# Patient Record
Sex: FEMALE | Race: WHITE | Employment: OTHER | URBAN - NONMETROPOLITAN AREA
[De-identification: names, ages, dates, MRNs, and addresses within clinical notes are randomized per-mention and may not be internally consistent; named-entity substitution may affect disease eponyms.]

---

## 2023-10-14 ENCOUNTER — HOSPITAL ENCOUNTER (INPATIENT)
Age: 63
LOS: 1 days | Discharge: ANOTHER HOSPITAL | DRG: 101 | End: 2023-10-15
Attending: EMERGENCY MEDICINE | Admitting: STUDENT IN AN ORGANIZED HEALTH CARE EDUCATION/TRAINING PROGRAM
Payer: MEDICARE

## 2023-10-14 ENCOUNTER — APPOINTMENT (OUTPATIENT)
Dept: CT IMAGING | Age: 63
DRG: 101 | End: 2023-10-14
Payer: MEDICARE

## 2023-10-14 DIAGNOSIS — R41.82 ALTERED MENTAL STATUS, UNSPECIFIED ALTERED MENTAL STATUS TYPE: Primary | ICD-10-CM

## 2023-10-14 DIAGNOSIS — R55 SYNCOPE, UNSPECIFIED SYNCOPE TYPE: ICD-10-CM

## 2023-10-14 PROBLEM — R53.1 GENERALIZED WEAKNESS: Status: ACTIVE | Noted: 2023-10-14

## 2023-10-14 PROBLEM — R41.0 ACUTE DELIRIUM: Status: ACTIVE | Noted: 2023-10-14

## 2023-10-14 PROBLEM — J34.89 RHINORRHEA: Status: ACTIVE | Noted: 2023-10-14

## 2023-10-14 PROBLEM — R53.83 FATIGUE: Status: ACTIVE | Noted: 2023-10-14

## 2023-10-14 PROBLEM — R40.4 STARING EPISODES: Status: ACTIVE | Noted: 2023-10-14

## 2023-10-14 PROBLEM — I10 HTN (HYPERTENSION): Status: ACTIVE | Noted: 2023-10-14

## 2023-10-14 PROBLEM — K21.9 GERD (GASTROESOPHAGEAL REFLUX DISEASE): Status: ACTIVE | Noted: 2023-10-14

## 2023-10-14 PROBLEM — R29.90 STROKE-LIKE SYMPTOMS: Status: ACTIVE | Noted: 2023-10-14

## 2023-10-14 LAB
ALBUMIN SERPL-MCNC: 4.2 G/DL (ref 3.5–5.2)
ALBUMIN/GLOB SERPL: 1.4 {RATIO} (ref 1–2.5)
ALP SERPL-CCNC: 128 U/L (ref 35–104)
ALT SERPL-CCNC: 9 U/L (ref 5–33)
ANION GAP SERPL CALCULATED.3IONS-SCNC: 10 MMOL/L (ref 9–17)
AST SERPL-CCNC: 16 U/L
BACTERIA URNS QL MICRO: ABNORMAL
BASOPHILS # BLD: 0.04 K/UL (ref 0–0.2)
BASOPHILS NFR BLD: 1 % (ref 0–2)
BILIRUB SERPL-MCNC: 0.5 MG/DL (ref 0.3–1.2)
BILIRUB UR QL STRIP: NEGATIVE
BUN SERPL-MCNC: 15 MG/DL (ref 8–23)
BUN/CREAT SERPL: 17 (ref 9–20)
CALCIUM SERPL-MCNC: 9.4 MG/DL (ref 8.6–10.4)
CHLORIDE SERPL-SCNC: 106 MMOL/L (ref 98–107)
CLARITY UR: CLEAR
CO2 SERPL-SCNC: 25 MMOL/L (ref 20–31)
COLOR UR: YELLOW
CREAT SERPL-MCNC: 0.9 MG/DL (ref 0.5–0.9)
EOSINOPHIL # BLD: 0.08 K/UL (ref 0–0.44)
EOSINOPHILS RELATIVE PERCENT: 2 % (ref 1–4)
EPI CELLS #/AREA URNS HPF: ABNORMAL /HPF (ref 0–25)
ERYTHROCYTE [DISTWIDTH] IN BLOOD BY AUTOMATED COUNT: 13.5 % (ref 11.8–14.4)
GFR SERPL CREATININE-BSD FRML MDRD: >60 ML/MIN/1.73M2
GLUCOSE SERPL-MCNC: 148 MG/DL (ref 70–99)
GLUCOSE UR STRIP-MCNC: NEGATIVE MG/DL
HCT VFR BLD AUTO: 42.1 % (ref 36.3–47.1)
HGB BLD-MCNC: 13.7 G/DL (ref 11.9–15.1)
HGB UR QL STRIP.AUTO: NEGATIVE
IMM GRANULOCYTES # BLD AUTO: <0.03 K/UL (ref 0–0.3)
IMM GRANULOCYTES NFR BLD: 0 %
KETONES UR STRIP-MCNC: NEGATIVE MG/DL
LACTATE BLDV-SCNC: 1.7 MMOL/L (ref 0.5–2.2)
LEUKOCYTE ESTERASE UR QL STRIP: NEGATIVE
LYMPHOCYTES NFR BLD: 1.63 K/UL (ref 1.1–3.7)
LYMPHOCYTES RELATIVE PERCENT: 35 % (ref 24–43)
MAGNESIUM SERPL-MCNC: 2 MG/DL (ref 1.6–2.6)
MCH RBC QN AUTO: 29.6 PG (ref 25.2–33.5)
MCHC RBC AUTO-ENTMCNC: 32.5 G/DL (ref 28.4–34.8)
MCV RBC AUTO: 90.9 FL (ref 82.6–102.9)
MONOCYTES NFR BLD: 0.27 K/UL (ref 0.1–1.2)
MONOCYTES NFR BLD: 6 % (ref 3–12)
MUCOUS THREADS URNS QL MICRO: ABNORMAL
NEUTROPHILS NFR BLD: 56 % (ref 36–65)
NEUTS SEG NFR BLD: 2.63 K/UL (ref 1.5–8.1)
NITRITE UR QL STRIP: NEGATIVE
NRBC BLD-RTO: 0 PER 100 WBC
PH UR STRIP: 6 [PH] (ref 5–9)
PLATELET # BLD AUTO: 303 K/UL (ref 138–453)
PMV BLD AUTO: 10.4 FL (ref 8.1–13.5)
POTASSIUM SERPL-SCNC: 3.8 MMOL/L (ref 3.7–5.3)
PROT SERPL-MCNC: 7.1 G/DL (ref 6.4–8.3)
PROT UR STRIP-MCNC: NEGATIVE MG/DL
RBC # BLD AUTO: 4.63 M/UL (ref 3.95–5.11)
RBC #/AREA URNS HPF: ABNORMAL /HPF (ref 0–2)
SARS-COV-2 RDRP RESP QL NAA+PROBE: NOT DETECTED
SODIUM SERPL-SCNC: 141 MMOL/L (ref 135–144)
SP GR UR STRIP: 1.01 (ref 1.01–1.02)
SPECIMEN DESCRIPTION: NORMAL
TROPONIN I SERPL HS-MCNC: 9 NG/L (ref 0–14)
TSH SERPL DL<=0.05 MIU/L-ACNC: 1.76 UIU/ML (ref 0.3–5)
UROBILINOGEN UR STRIP-ACNC: NORMAL EU/DL (ref 0–1)
WBC #/AREA URNS HPF: ABNORMAL /HPF (ref 0–5)
WBC OTHER # BLD: 4.7 K/UL (ref 3.5–11.3)

## 2023-10-14 PROCEDURE — 83735 ASSAY OF MAGNESIUM: CPT

## 2023-10-14 PROCEDURE — 84484 ASSAY OF TROPONIN QUANT: CPT

## 2023-10-14 PROCEDURE — 82607 VITAMIN B-12: CPT

## 2023-10-14 PROCEDURE — 84443 ASSAY THYROID STIM HORMONE: CPT

## 2023-10-14 PROCEDURE — 93005 ELECTROCARDIOGRAM TRACING: CPT | Performed by: EMERGENCY MEDICINE

## 2023-10-14 PROCEDURE — 83605 ASSAY OF LACTIC ACID: CPT

## 2023-10-14 PROCEDURE — 87086 URINE CULTURE/COLONY COUNT: CPT

## 2023-10-14 PROCEDURE — 85025 COMPLETE CBC W/AUTO DIFF WBC: CPT

## 2023-10-14 PROCEDURE — 1200000000 HC SEMI PRIVATE

## 2023-10-14 PROCEDURE — 6360000004 HC RX CONTRAST MEDICATION: Performed by: EMERGENCY MEDICINE

## 2023-10-14 PROCEDURE — 87635 SARS-COV-2 COVID-19 AMP PRB: CPT

## 2023-10-14 PROCEDURE — 81001 URINALYSIS AUTO W/SCOPE: CPT

## 2023-10-14 PROCEDURE — 94761 N-INVAS EAR/PLS OXIMETRY MLT: CPT

## 2023-10-14 PROCEDURE — 82746 ASSAY OF FOLIC ACID SERUM: CPT

## 2023-10-14 PROCEDURE — 83036 HEMOGLOBIN GLYCOSYLATED A1C: CPT

## 2023-10-14 PROCEDURE — 80053 COMPREHEN METABOLIC PANEL: CPT

## 2023-10-14 PROCEDURE — 99285 EMERGENCY DEPT VISIT HI MDM: CPT

## 2023-10-14 PROCEDURE — 70450 CT HEAD/BRAIN W/O DYE: CPT

## 2023-10-14 PROCEDURE — 6360000002 HC RX W HCPCS: Performed by: STUDENT IN AN ORGANIZED HEALTH CARE EDUCATION/TRAINING PROGRAM

## 2023-10-14 PROCEDURE — 74177 CT ABD & PELVIS W/CONTRAST: CPT

## 2023-10-14 PROCEDURE — 6370000000 HC RX 637 (ALT 250 FOR IP): Performed by: STUDENT IN AN ORGANIZED HEALTH CARE EDUCATION/TRAINING PROGRAM

## 2023-10-14 PROCEDURE — 2580000003 HC RX 258: Performed by: STUDENT IN AN ORGANIZED HEALTH CARE EDUCATION/TRAINING PROGRAM

## 2023-10-14 PROCEDURE — 36415 COLL VENOUS BLD VENIPUNCTURE: CPT

## 2023-10-14 RX ORDER — OMEPRAZOLE 20 MG/1
20 CAPSULE, DELAYED RELEASE ORAL DAILY
COMMUNITY

## 2023-10-14 RX ORDER — TOPIRAMATE 100 MG/1
100 TABLET, FILM COATED ORAL 2 TIMES DAILY
Status: DISCONTINUED | OUTPATIENT
Start: 2023-10-14 | End: 2023-10-15 | Stop reason: HOSPADM

## 2023-10-14 RX ORDER — ONDANSETRON 4 MG/1
4 TABLET, ORALLY DISINTEGRATING ORAL EVERY 8 HOURS PRN
Status: DISCONTINUED | OUTPATIENT
Start: 2023-10-14 | End: 2023-10-15 | Stop reason: HOSPADM

## 2023-10-14 RX ORDER — ONDANSETRON 2 MG/ML
4 INJECTION INTRAMUSCULAR; INTRAVENOUS EVERY 6 HOURS PRN
Status: DISCONTINUED | OUTPATIENT
Start: 2023-10-14 | End: 2023-10-15 | Stop reason: HOSPADM

## 2023-10-14 RX ORDER — POLYETHYLENE GLYCOL 3350 17 G/17G
17 POWDER, FOR SOLUTION ORAL DAILY PRN
Status: DISCONTINUED | OUTPATIENT
Start: 2023-10-14 | End: 2023-10-15 | Stop reason: HOSPADM

## 2023-10-14 RX ORDER — FUROSEMIDE 20 MG/1
20 TABLET ORAL DAILY
Status: DISCONTINUED | OUTPATIENT
Start: 2023-10-14 | End: 2023-10-15 | Stop reason: HOSPADM

## 2023-10-14 RX ORDER — SODIUM CHLORIDE 9 MG/ML
INJECTION, SOLUTION INTRAVENOUS CONTINUOUS
Status: DISCONTINUED | OUTPATIENT
Start: 2023-10-14 | End: 2023-10-15 | Stop reason: HOSPADM

## 2023-10-14 RX ORDER — PANTOPRAZOLE SODIUM 40 MG/1
40 TABLET, DELAYED RELEASE ORAL
Status: DISCONTINUED | OUTPATIENT
Start: 2023-10-15 | End: 2023-10-15 | Stop reason: HOSPADM

## 2023-10-14 RX ORDER — MAGNESIUM SULFATE IN WATER 40 MG/ML
2000 INJECTION, SOLUTION INTRAVENOUS PRN
Status: DISCONTINUED | OUTPATIENT
Start: 2023-10-14 | End: 2023-10-15 | Stop reason: HOSPADM

## 2023-10-14 RX ORDER — FLUTICASONE PROPIONATE AND SALMETEROL XINAFOATE 115; 21 UG/1; UG/1
2 AEROSOL, METERED RESPIRATORY (INHALATION) 2 TIMES DAILY
COMMUNITY

## 2023-10-14 RX ORDER — POTASSIUM CHLORIDE 7.45 MG/ML
10 INJECTION INTRAVENOUS PRN
Status: DISCONTINUED | OUTPATIENT
Start: 2023-10-14 | End: 2023-10-15 | Stop reason: HOSPADM

## 2023-10-14 RX ORDER — ATORVASTATIN CALCIUM 10 MG/1
10 TABLET, FILM COATED ORAL DAILY
Status: DISCONTINUED | OUTPATIENT
Start: 2023-10-14 | End: 2023-10-15 | Stop reason: HOSPADM

## 2023-10-14 RX ORDER — SODIUM CHLORIDE 9 MG/ML
INJECTION, SOLUTION INTRAVENOUS PRN
Status: DISCONTINUED | OUTPATIENT
Start: 2023-10-14 | End: 2023-10-15 | Stop reason: HOSPADM

## 2023-10-14 RX ORDER — SIMVASTATIN 20 MG
20 TABLET ORAL NIGHTLY
COMMUNITY

## 2023-10-14 RX ORDER — POTASSIUM CHLORIDE 20 MEQ/1
40 TABLET, EXTENDED RELEASE ORAL PRN
Status: DISCONTINUED | OUTPATIENT
Start: 2023-10-14 | End: 2023-10-15 | Stop reason: HOSPADM

## 2023-10-14 RX ORDER — ENOXAPARIN SODIUM 100 MG/ML
30 INJECTION SUBCUTANEOUS 2 TIMES DAILY
Status: DISCONTINUED | OUTPATIENT
Start: 2023-10-14 | End: 2023-10-15 | Stop reason: HOSPADM

## 2023-10-14 RX ORDER — ACETAMINOPHEN 325 MG/1
650 TABLET ORAL EVERY 6 HOURS PRN
Status: DISCONTINUED | OUTPATIENT
Start: 2023-10-14 | End: 2023-10-15 | Stop reason: HOSPADM

## 2023-10-14 RX ORDER — SODIUM CHLORIDE 0.9 % (FLUSH) 0.9 %
5-40 SYRINGE (ML) INJECTION PRN
Status: DISCONTINUED | OUTPATIENT
Start: 2023-10-14 | End: 2023-10-15 | Stop reason: HOSPADM

## 2023-10-14 RX ORDER — KETOTIFEN FUMARATE 0.35 MG/ML
1 SOLUTION/ DROPS OPHTHALMIC 2 TIMES DAILY
Status: DISCONTINUED | OUTPATIENT
Start: 2023-10-14 | End: 2023-10-15 | Stop reason: HOSPADM

## 2023-10-14 RX ORDER — ALBUTEROL SULFATE 90 UG/1
2 AEROSOL, METERED RESPIRATORY (INHALATION) EVERY 6 HOURS PRN
COMMUNITY

## 2023-10-14 RX ORDER — LOSARTAN POTASSIUM 25 MG/1
25 TABLET ORAL DAILY
Status: DISCONTINUED | OUTPATIENT
Start: 2023-10-14 | End: 2023-10-15 | Stop reason: HOSPADM

## 2023-10-14 RX ORDER — ACETAMINOPHEN 160 MG
2000 TABLET,DISINTEGRATING ORAL DAILY
COMMUNITY

## 2023-10-14 RX ORDER — TOPIRAMATE 100 MG/1
100 TABLET, FILM COATED ORAL 2 TIMES DAILY
COMMUNITY

## 2023-10-14 RX ORDER — SODIUM CHLORIDE 0.9 % (FLUSH) 0.9 %
5-40 SYRINGE (ML) INJECTION EVERY 12 HOURS SCHEDULED
Status: DISCONTINUED | OUTPATIENT
Start: 2023-10-14 | End: 2023-10-15 | Stop reason: HOSPADM

## 2023-10-14 RX ORDER — MONTELUKAST SODIUM 10 MG/1
10 TABLET ORAL NIGHTLY
COMMUNITY

## 2023-10-14 RX ORDER — LOSARTAN POTASSIUM 25 MG/1
25 TABLET ORAL DAILY
COMMUNITY

## 2023-10-14 RX ORDER — VITAMIN B COMPLEX
2000 TABLET ORAL DAILY
Status: DISCONTINUED | OUTPATIENT
Start: 2023-10-14 | End: 2023-10-15 | Stop reason: HOSPADM

## 2023-10-14 RX ORDER — FUROSEMIDE 20 MG/1
20 TABLET ORAL DAILY
COMMUNITY

## 2023-10-14 RX ORDER — MONTELUKAST SODIUM 10 MG/1
10 TABLET ORAL NIGHTLY
Status: DISCONTINUED | OUTPATIENT
Start: 2023-10-14 | End: 2023-10-15 | Stop reason: HOSPADM

## 2023-10-14 RX ORDER — KETOTIFEN FUMARATE 0.35 MG/ML
1 SOLUTION/ DROPS OPHTHALMIC 2 TIMES DAILY
COMMUNITY

## 2023-10-14 RX ORDER — ACETAMINOPHEN 650 MG/1
650 SUPPOSITORY RECTAL EVERY 6 HOURS PRN
Status: DISCONTINUED | OUTPATIENT
Start: 2023-10-14 | End: 2023-10-15 | Stop reason: HOSPADM

## 2023-10-14 RX ADMIN — ENOXAPARIN SODIUM 30 MG: 100 INJECTION SUBCUTANEOUS at 21:21

## 2023-10-14 RX ADMIN — IOPAMIDOL 75 ML: 755 INJECTION, SOLUTION INTRAVENOUS at 15:45

## 2023-10-14 RX ADMIN — MONTELUKAST 10 MG: 10 TABLET, FILM COATED ORAL at 21:21

## 2023-10-14 RX ADMIN — METFORMIN HYDROCHLORIDE 500 MG: 500 TABLET ORAL at 21:21

## 2023-10-14 RX ADMIN — TOPIRAMATE 100 MG: 100 TABLET, FILM COATED ORAL at 21:21

## 2023-10-14 RX ADMIN — SODIUM CHLORIDE: 9 INJECTION, SOLUTION INTRAVENOUS at 19:38

## 2023-10-14 RX ADMIN — ATORVASTATIN CALCIUM 10 MG: 10 TABLET, FILM COATED ORAL at 21:21

## 2023-10-14 ASSESSMENT — PAIN SCALES - GENERAL
PAINLEVEL_OUTOF10: 10
PAINLEVEL_OUTOF10: 7

## 2023-10-14 ASSESSMENT — PAIN DESCRIPTION - FREQUENCY: FREQUENCY: CONTINUOUS

## 2023-10-14 ASSESSMENT — ENCOUNTER SYMPTOMS
VOICE CHANGE: 0
COUGH: 0
RHINORRHEA: 1
NAUSEA: 0
SORE THROAT: 0
TROUBLE SWALLOWING: 0
VOMITING: 0
SHORTNESS OF BREATH: 0
ABDOMINAL PAIN: 0

## 2023-10-14 ASSESSMENT — PAIN DESCRIPTION - ORIENTATION: ORIENTATION: MID

## 2023-10-14 ASSESSMENT — PAIN DESCRIPTION - LOCATION
LOCATION: HEAD
LOCATION: HEAD

## 2023-10-14 ASSESSMENT — PAIN DESCRIPTION - PAIN TYPE: TYPE: ACUTE PAIN

## 2023-10-14 ASSESSMENT — PAIN - FUNCTIONAL ASSESSMENT
PAIN_FUNCTIONAL_ASSESSMENT: 0-10
PAIN_FUNCTIONAL_ASSESSMENT: ACTIVITIES ARE NOT PREVENTED

## 2023-10-14 ASSESSMENT — PAIN DESCRIPTION - ONSET: ONSET: ON-GOING

## 2023-10-14 ASSESSMENT — PAIN DESCRIPTION - DESCRIPTORS: DESCRIPTORS: ACHING

## 2023-10-14 NOTE — ED PROVIDER NOTES
arrrange. FINAL IMPRESSION      1. Altered mental status, unspecified altered mental status type    2.  Syncope, unspecified syncope type          DISPOSITION/PLAN     DISPOSITION Admitted 10/14/2023 04:52:42 PM    (Please note that portions of this note were completed with a voice recognition program.  Efforts were made to edit the dictations but occasionally words are mis-transcribed.)    aJmes Lima MD (electronically signed)  Attending Emergency Physician            James Lima MD  10/15/23 9198

## 2023-10-14 NOTE — ED NOTES
Patient up to bathroom with wheelchair. No complaints at this time.      Gilles Dubois RN  10/14/23 0123

## 2023-10-15 VITALS
HEART RATE: 85 BPM | DIASTOLIC BLOOD PRESSURE: 69 MMHG | BODY MASS INDEX: 40.55 KG/M2 | RESPIRATION RATE: 18 BRPM | TEMPERATURE: 98.7 F | OXYGEN SATURATION: 97 % | HEIGHT: 63 IN | SYSTOLIC BLOOD PRESSURE: 126 MMHG | WEIGHT: 228.84 LBS

## 2023-10-15 PROBLEM — R56.9 SEIZURE-LIKE ACTIVITY (HCC): Status: ACTIVE | Noted: 2023-10-15

## 2023-10-15 LAB
ALBUMIN SERPL-MCNC: 4 G/DL (ref 3.5–5.2)
ALBUMIN/GLOB SERPL: 1.1 {RATIO} (ref 1–2.5)
ALP SERPL-CCNC: 133 U/L (ref 35–104)
ALT SERPL-CCNC: 9 U/L (ref 5–33)
ANION GAP SERPL CALCULATED.3IONS-SCNC: 13 MMOL/L (ref 9–17)
AST SERPL-CCNC: 17 U/L
BASOPHILS # BLD: 0.04 K/UL (ref 0–0.2)
BASOPHILS NFR BLD: 1 % (ref 0–2)
BILIRUB SERPL-MCNC: 0.4 MG/DL (ref 0.3–1.2)
BUN SERPL-MCNC: 14 MG/DL (ref 8–23)
BUN/CREAT SERPL: 14 (ref 9–20)
CALCIUM SERPL-MCNC: 9.4 MG/DL (ref 8.6–10.4)
CHLORIDE SERPL-SCNC: 103 MMOL/L (ref 98–107)
CO2 SERPL-SCNC: 25 MMOL/L (ref 20–31)
CREAT SERPL-MCNC: 1 MG/DL (ref 0.5–0.9)
EKG ATRIAL RATE: 67 BPM
EKG P AXIS: 34 DEGREES
EKG P-R INTERVAL: 162 MS
EKG Q-T INTERVAL: 436 MS
EKG QRS DURATION: 84 MS
EKG QTC CALCULATION (BAZETT): 460 MS
EKG R AXIS: -14 DEGREES
EKG T AXIS: -61 DEGREES
EKG VENTRICULAR RATE: 67 BPM
EOSINOPHIL # BLD: 0.15 K/UL (ref 0–0.44)
EOSINOPHILS RELATIVE PERCENT: 2 % (ref 1–4)
ERYTHROCYTE [DISTWIDTH] IN BLOOD BY AUTOMATED COUNT: 13.7 % (ref 11.8–14.4)
EST. AVERAGE GLUCOSE BLD GHB EST-MCNC: 131 MG/DL
FOLATE SERPL-MCNC: 8 NG/ML (ref 4.8–24.2)
GFR SERPL CREATININE-BSD FRML MDRD: >60 ML/MIN/1.73M2
GLUCOSE SERPL-MCNC: 106 MG/DL (ref 70–99)
HBA1C MFR BLD: 6.2 % (ref 4–6)
HCT VFR BLD AUTO: 43.2 % (ref 36.3–47.1)
HGB BLD-MCNC: 13.9 G/DL (ref 11.9–15.1)
IMM GRANULOCYTES # BLD AUTO: <0.03 K/UL (ref 0–0.3)
IMM GRANULOCYTES NFR BLD: 0 %
LYMPHOCYTES NFR BLD: 3 K/UL (ref 1.1–3.7)
LYMPHOCYTES RELATIVE PERCENT: 40 % (ref 24–43)
MCH RBC QN AUTO: 29.7 PG (ref 25.2–33.5)
MCHC RBC AUTO-ENTMCNC: 32.2 G/DL (ref 28.4–34.8)
MCV RBC AUTO: 92.3 FL (ref 82.6–102.9)
MICROORGANISM SPEC CULT: NORMAL
MONOCYTES NFR BLD: 0.48 K/UL (ref 0.1–1.2)
MONOCYTES NFR BLD: 7 % (ref 3–12)
NEUTROPHILS NFR BLD: 50 % (ref 36–65)
NEUTS SEG NFR BLD: 3.74 K/UL (ref 1.5–8.1)
NRBC BLD-RTO: 0 PER 100 WBC
PLATELET # BLD AUTO: 305 K/UL (ref 138–453)
PMV BLD AUTO: 10.5 FL (ref 8.1–13.5)
POTASSIUM SERPL-SCNC: 3.6 MMOL/L (ref 3.7–5.3)
PROLACTIN SERPL-MCNC: 29.2 NG/ML (ref 4.79–23.3)
PROT SERPL-MCNC: 7.5 G/DL (ref 6.4–8.3)
RBC # BLD AUTO: 4.68 M/UL (ref 3.95–5.11)
SODIUM SERPL-SCNC: 141 MMOL/L (ref 135–144)
SPECIMEN DESCRIPTION: NORMAL
VIT B12 SERPL-MCNC: 316 PG/ML (ref 232–1245)
WBC OTHER # BLD: 7.4 K/UL (ref 3.5–11.3)

## 2023-10-15 PROCEDURE — 6370000000 HC RX 637 (ALT 250 FOR IP): Performed by: STUDENT IN AN ORGANIZED HEALTH CARE EDUCATION/TRAINING PROGRAM

## 2023-10-15 PROCEDURE — 93010 ELECTROCARDIOGRAM REPORT: CPT | Performed by: FAMILY MEDICINE

## 2023-10-15 PROCEDURE — 36415 COLL VENOUS BLD VENIPUNCTURE: CPT

## 2023-10-15 PROCEDURE — 6360000002 HC RX W HCPCS: Performed by: STUDENT IN AN ORGANIZED HEALTH CARE EDUCATION/TRAINING PROGRAM

## 2023-10-15 PROCEDURE — 94640 AIRWAY INHALATION TREATMENT: CPT

## 2023-10-15 PROCEDURE — 94761 N-INVAS EAR/PLS OXIMETRY MLT: CPT

## 2023-10-15 PROCEDURE — 93005 ELECTROCARDIOGRAM TRACING: CPT | Performed by: STUDENT IN AN ORGANIZED HEALTH CARE EDUCATION/TRAINING PROGRAM

## 2023-10-15 PROCEDURE — 94664 DEMO&/EVAL PT USE INHALER: CPT

## 2023-10-15 PROCEDURE — 80053 COMPREHEN METABOLIC PANEL: CPT

## 2023-10-15 PROCEDURE — 84146 ASSAY OF PROLACTIN: CPT

## 2023-10-15 PROCEDURE — 2580000003 HC RX 258: Performed by: STUDENT IN AN ORGANIZED HEALTH CARE EDUCATION/TRAINING PROGRAM

## 2023-10-15 PROCEDURE — 85025 COMPLETE CBC W/AUTO DIFF WBC: CPT

## 2023-10-15 RX ORDER — LEVETIRACETAM 500 MG/1
500 TABLET ORAL 2 TIMES DAILY
Status: DISCONTINUED | OUTPATIENT
Start: 2023-10-15 | End: 2023-10-15 | Stop reason: HOSPADM

## 2023-10-15 RX ORDER — ALBUTEROL SULFATE 2.5 MG/3ML
2.5 SOLUTION RESPIRATORY (INHALATION)
Status: DISCONTINUED | OUTPATIENT
Start: 2023-10-15 | End: 2023-10-15

## 2023-10-15 RX ORDER — LEVETIRACETAM 10 MG/ML
1000 INJECTION INTRAVASCULAR ONCE
Status: COMPLETED | OUTPATIENT
Start: 2023-10-15 | End: 2023-10-15

## 2023-10-15 RX ORDER — ALBUTEROL SULFATE 2.5 MG/3ML
2.5 SOLUTION RESPIRATORY (INHALATION) EVERY 4 HOURS PRN
Status: DISCONTINUED | OUTPATIENT
Start: 2023-10-15 | End: 2023-10-15 | Stop reason: HOSPADM

## 2023-10-15 RX ORDER — ACETAMINOPHEN 500 MG
1000 TABLET ORAL ONCE
Status: COMPLETED | OUTPATIENT
Start: 2023-10-15 | End: 2023-10-15

## 2023-10-15 RX ADMIN — PANTOPRAZOLE SODIUM 40 MG: 40 TABLET, DELAYED RELEASE ORAL at 07:26

## 2023-10-15 RX ADMIN — LOSARTAN POTASSIUM 25 MG: 25 TABLET, FILM COATED ORAL at 09:31

## 2023-10-15 RX ADMIN — METFORMIN HYDROCHLORIDE 500 MG: 500 TABLET ORAL at 07:26

## 2023-10-15 RX ADMIN — ENOXAPARIN SODIUM 30 MG: 100 INJECTION SUBCUTANEOUS at 09:32

## 2023-10-15 RX ADMIN — MOMETASONE FUROATE AND FORMOTEROL FUMARATE DIHYDRATE 2 PUFF: 200; 5 AEROSOL RESPIRATORY (INHALATION) at 10:09

## 2023-10-15 RX ADMIN — LEVETIRACETAM 500 MG: 500 TABLET, FILM COATED ORAL at 09:31

## 2023-10-15 RX ADMIN — TOPIRAMATE 100 MG: 100 TABLET, FILM COATED ORAL at 09:40

## 2023-10-15 RX ADMIN — FUROSEMIDE 20 MG: 20 TABLET ORAL at 09:31

## 2023-10-15 RX ADMIN — ACETAMINOPHEN 1000 MG: 500 TABLET ORAL at 09:39

## 2023-10-15 RX ADMIN — SERTRALINE HYDROCHLORIDE 50 MG: 50 TABLET ORAL at 09:40

## 2023-10-15 RX ADMIN — SODIUM CHLORIDE: 9 INJECTION, SOLUTION INTRAVENOUS at 09:06

## 2023-10-15 RX ADMIN — ATORVASTATIN CALCIUM 10 MG: 10 TABLET, FILM COATED ORAL at 09:31

## 2023-10-15 RX ADMIN — LEVETIRACETAM 1000 MG: 10 INJECTION, SOLUTION INTRAVENOUS at 05:40

## 2023-10-15 RX ADMIN — ALBUTEROL SULFATE 2.5 MG: 2.5 SOLUTION RESPIRATORY (INHALATION) at 10:09

## 2023-10-15 RX ADMIN — Medication 2000 UNITS: at 09:31

## 2023-10-15 ASSESSMENT — PAIN DESCRIPTION - ORIENTATION
ORIENTATION: MID
ORIENTATION: MID;LOWER

## 2023-10-15 ASSESSMENT — PAIN DESCRIPTION - FREQUENCY
FREQUENCY: CONTINUOUS
FREQUENCY: CONTINUOUS

## 2023-10-15 ASSESSMENT — PAIN SCALES - GENERAL
PAINLEVEL_OUTOF10: 7
PAINLEVEL_OUTOF10: 8
PAINLEVEL_OUTOF10: 8

## 2023-10-15 ASSESSMENT — PAIN DESCRIPTION - PAIN TYPE
TYPE: CHRONIC PAIN
TYPE: CHRONIC PAIN

## 2023-10-15 ASSESSMENT — PAIN DESCRIPTION - LOCATION
LOCATION: HEAD
LOCATION: HEAD;BACK

## 2023-10-15 ASSESSMENT — PAIN DESCRIPTION - ONSET
ONSET: ON-GOING
ONSET: ON-GOING

## 2023-10-15 ASSESSMENT — PAIN - FUNCTIONAL ASSESSMENT
PAIN_FUNCTIONAL_ASSESSMENT: PREVENTS OR INTERFERES SOME ACTIVE ACTIVITIES AND ADLS
PAIN_FUNCTIONAL_ASSESSMENT: PREVENTS OR INTERFERES SOME ACTIVE ACTIVITIES AND ADLS

## 2023-10-15 ASSESSMENT — PAIN DESCRIPTION - DESCRIPTORS
DESCRIPTORS: ACHING
DESCRIPTORS: ACHING

## 2023-10-15 NOTE — H&P
Stroke-like symptoms    Staring episodes  Resolved Problems:    * No resolved hospital problems. *      PLAN:     Patient status: Admit the patient    MEDICAL DECISION MAKING:    Primary Problem(s): Seizure-like activity ([address])  Differential diagnoses: Stroke/TIA, Absence seizures, Presyncope, Migraine Headache  Condition is an acute or chronic illness or injury that poses threat to life or bodily function  Condition is at treatment goal  Treatment plan: Continue current treatment  Imaging: MRI Brain and ECHO pending  Medications: Continue current / home meds  Medication Monitoring / High Risk Medications: none   Discussed with Neurology , start on Keppra today  Monitor labs, prolactin pending (taken within 20 min of the episode)  IVF     Nutrition status: Well developed, well nourished with no malnutrition  Dietician consult initiated    Hospital Prophylaxis:   DVT: Lovenox   Stress Ulcer: PPI     MDM Data:   Management and/or test interpretation discussed with ER MD at time of admission  Consults and Nursing notes were personally reviewed, all current labs and imaging were personally reviewed, tests ordered: CBC, BMP, and history obtained by independent historian     MIPS Medication Reconciliation documentation:    [x] I have utilized all available immediate resources to obtain, update, or review the patient's current medications (including all prescriptions, over-the-counter products, herbals, cannabinoid products and vitamin/mineral/dietary/nutritional supplements. Disposition:  Shared decision making:  All test results, treatment options and disposition options were discussed with the patient and family members today  Social determinants of health that may impact management: none  Code status: Full Code   Disposition: Discharge plan is pending    Consults: IP CONSULT TO CASE MANAGEMENT  PT/OT  Vital signs per unit routine  Pulse Oximetry   RT Evaluation & Treat   Diet  Daily weights  Fall

## 2023-10-15 NOTE — DISCHARGE SUMMARY
MCG/ACT inhaler  Commonly known as: ADVAIR HFA     furosemide 20 MG tablet  Commonly known as: LASIX     ketotifen 0.025 % ophthalmic solution  Commonly known as: ZADITOR     losartan 25 MG tablet  Commonly known as: COZAAR     metFORMIN 500 MG tablet  Commonly known as: GLUCOPHAGE     montelukast 10 MG tablet  Commonly known as: SINGULAIR     omeprazole 20 MG delayed release capsule  Commonly known as: PRILOSEC     sertraline 50 MG tablet  Commonly known as: ZOLOFT     simvastatin 20 MG tablet  Commonly known as: ZOCOR     topiramate 100 MG tablet  Commonly known as: TOPAMAX     Ventolin  (90 Base) MCG/ACT inhaler  Generic drug: albuterol sulfate HFA     Vitamin D3 50 MCG (2000 UT) Caps              Send Copies to: PCP (Pending)    Patient Instructions: Activity: bedrest/seizure precautions  Diet: cardiac diet/may hold pending clinical status  Wound Care: none needed    CORE MEASURES on Discharge (if applicable) - N/A    Total time spent on discharge services: 45 minutes  Including the following activities:  Evaluation and Management of patient  Discussion with patient and/or surrogate about current care plan  Coordination with Case Management and/or   Coordination of care with Consultants (if applicable)   Coordination of care with Receiving Facility Physician (if applicable)  Completion of DME forms (if applicable)  Preparation of Discharge Summary  Preparation of Medication Reconciliation  Preparation of Discharge Prescriptions    Please note that this chart was generated using voice recognition Dragon dictation software. Although every effort was made to ensure the accuracy of this automated transcription, some errors in transcription may have occurred.     Zane Stevens MD  10/15/2023 5:55 PM

## 2023-10-15 NOTE — PROGRESS NOTES
Patient alert and oriented x4 at this time. Vitals charted, see flow sheet. Will continue to monitor.

## 2023-10-15 NOTE — PROGRESS NOTES
[address]  Inpatient/Observation/Outpatient Rehabilitation    Date: 10/15/2023  Patient Name: Deana Srivastava       [x] Inpatient Acute/Observation       []  Outpatient  : 1960       [] Pt no showed for scheduled appointment    [] Pt refused/declined therapy at this time due to:           [x] Pt cancelled due to:  [] No Reason Given   [] Sick/ill   [] Other: Per RN- pt being transferred to [facility] this date. OT deferred at this time.        David Zheng, OT Date: 10/15/2023

## 2023-10-15 NOTE — PROGRESS NOTES
Writer to bedside to complete morning assessment. Upon entry to room, pt awake and lying in bed, respirations even and unlabored while on room air. Vitals obtained and assessment completed, see flow sheet for details. Pt denies needs from writer at this time. Call light in reach. Care ongoing.

## 2023-10-15 NOTE — RT PROTOCOL NOTE
non-prod. []  Effective & prod. Less than 25 ml (2 TBSP) over past 24 hrs []  Ineffective & non-prod to less than 25 ML over past 24 hrs []  Ineffective and, or greater than 25 ml sputum prod. past 24 hrs. []  Nonspon- taneous; Requires suctioning 0   Pulmonary History  (PULM HX) [x]  No smoking and no chronic pulmonary history []  Former smoker. Quit over 12 mos. ago []  Current smoker or quit w/ in 12 mos []  Pulm. History and, or 20 pk/yr smoking hx []  Admitted w/ acute pulm. dx and, or has been admitted w/ pulm. dx 2 or more times over past 12 mos 0   Surgical History this Admit  (SURG HX) [x]  No surgery []  General surgery []  Lower abdominal []  Thoracic or upper abdominal   []  Thoracic w/ pulm. disease 0   Chest X-Ray (CXR)/CT Scan [x]  Clear or not applicable []  Not available []  Atelectasis or pleural effusions []  Localized infiltrate or pulm. edema []  Con-solidated Infiltrates, bilateral, or in more than 1 lobe 0   TOTAL ACUITY: 0       CARE PLAN    If Acuity Level is 2, 3, or 4 in any of the following:    [] BILATERAL BREATH SOUNDS (BBS)     [] PULMONARY HISTORY (PULM HX)  [] Respiratory Rate  (RR)    Goal: Improve respiratory functions in patients with airway disease and decrease WOB    [] AEROSOL PROTOCOL    Total Acuity:   14-28  []  Secondary Assessment in 24 hrs Total Acuity:  9-13  []  Secondary Assessment in 24 hrs Total Acuity:  4-8  []  Secondary Assessment in 24 hrs Total Acuity:  0-3  []  Secondary Assessment in 48 hrs   [facility] AEROSOL THERAPY with  [physician-ordered bronchodilator(s)] q 4 & Albuterol PRN q2 hrs. Breath-Actuated Neb if BBS Acuity = 4, and pt. can use MP. Notify physician if condition deteriorates. [facility] AEROSOL THERAPY with  [physician-ordered bronchodilator(s)]  QID and Albuterol PRN q4 hrs. Breath-Actuated Neb if BBS Acuity = 4, and pt. can use MP. Notify physician if condition deteriorates.  MDI THERAPY with  2 actuations of [physician-ordered bronchodilator(s)] via

## 2023-10-15 NOTE — PLAN OF CARE
Problem: Discharge Planning  Goal: Discharge to home or other facility with appropriate resources  Outcome: Progressing  Flowsheets (Taken 10/14/2023 1843)  Discharge to home or other facility with appropriate resources:   Identify barriers to discharge with patient and caregiver   Arrange for needed discharge resources and transportation as appropriate   Identify discharge learning needs (meds, wound care, etc)     Problem: Pain  Goal: Verbalizes/displays adequate comfort level or baseline comfort level  Outcome: Progressing  Flowsheets (Taken 10/14/2023 1843)  Verbalizes/displays adequate comfort level or baseline comfort level:   Encourage patient to monitor pain and request assistance   Assess pain using appropriate pain scale   Administer analgesics based on type and severity of pain and evaluate response     Problem: Safety - Adult  Goal: Free from fall injury  Outcome: Progressing  Flowsheets (Taken 10/15/2023 0103)  Free From Fall Injury: Instruct family/caregiver on patient safety

## 2023-10-15 NOTE — PROGRESS NOTES
Spoke with Arturo Balderas in pharmacy to verify pt home meds - all are  (daughter knew that when she gave them to me); pt receiving respiratory therapy while inpt; medications to be locked up in pt cabinet until d/c.

## 2023-10-15 NOTE — PROGRESS NOTES
Patient HR in the 30's on telemetry. Writer entered room to check on patient. Patient unresponsive to sternal rub and verbal stimuli. Pulse and respirations noted. Rapid response called at this time.

## 2023-10-15 NOTE — PROGRESS NOTES
Gave report to [facility] [place] - for transport to [facility]; pt belongings gathered.

## 2023-10-15 NOTE — PROGRESS NOTES
Difficulty arousing patient when asking her to wake up to sign forms for transfer; sternal rub; nail bed pressure; finally arousable with noxious smell; when asked where she was she was able to state place, year, name/; pt signed consent to transfer to [facility]. Vital signs stable.

## 2023-10-15 NOTE — PROGRESS NOTES
Patient arrived to room 301 at this time. Patient alert and oriented x4. Patient ambulated from wheelchair to bed with minimal assistance. Patient navigator complete with patient. Vitals and assessment complete, see flow sheet. Patient oriented to room. Call light within reach. Will continue to monitor.

## 2023-10-15 NOTE — PROGRESS NOTES
Called report to Jourdan Garcia at [facility], [phone]; Magaly Minor transported pt off the floor at [phone]; belongings went home with the son.

## 2023-10-15 NOTE — PROGRESS NOTES
Spoke with Cecy Kimball (son); called to check on his mom; he had called her on the phone and told him that \"she had another one [episode]\". I called Cecy Kimball on the phone and informed him that [address] transport ETA was 1600 for [facility], [address]. Care ongoing.

## 2023-10-16 LAB
EKG ATRIAL RATE: 59 BPM
EKG P AXIS: 38 DEGREES
EKG P-R INTERVAL: 190 MS
EKG Q-T INTERVAL: 488 MS
EKG QRS DURATION: 84 MS
EKG QTC CALCULATION (BAZETT): 483 MS
EKG R AXIS: -3 DEGREES
EKG T AXIS: -25 DEGREES
EKG VENTRICULAR RATE: 59 BPM

## 2023-10-16 PROCEDURE — 93010 ELECTROCARDIOGRAM REPORT: CPT | Performed by: FAMILY MEDICINE

## 2024-02-21 ENCOUNTER — OFFICE VISIT (OUTPATIENT)
Dept: PRIMARY CARE CLINIC | Age: 64
End: 2024-02-21

## 2024-02-21 ENCOUNTER — APPOINTMENT (OUTPATIENT)
Dept: CT IMAGING | Age: 64
End: 2024-02-21
Payer: MEDICARE

## 2024-02-21 ENCOUNTER — HOSPITAL ENCOUNTER (EMERGENCY)
Age: 64
Discharge: HOME | End: 2024-02-21
Attending: EMERGENCY MEDICINE
Payer: MEDICARE

## 2024-02-21 ENCOUNTER — APPOINTMENT (OUTPATIENT)
Dept: CT IMAGING | Age: 64
End: 2024-02-21
Attending: EMERGENCY MEDICINE
Payer: MEDICARE

## 2024-02-21 VITALS
OXYGEN SATURATION: 96 % | DIASTOLIC BLOOD PRESSURE: 55 MMHG | TEMPERATURE: 97.9 F | BODY MASS INDEX: 38.32 KG/M2 | HEIGHT: 65 IN | WEIGHT: 230 LBS | SYSTOLIC BLOOD PRESSURE: 106 MMHG | RESPIRATION RATE: 17 BRPM | HEART RATE: 78 BPM

## 2024-02-21 DIAGNOSIS — R56.9 SEIZURES (HCC): ICD-10-CM

## 2024-02-21 DIAGNOSIS — R40.4 EPISODE OF UNRESPONSIVENESS: Primary | ICD-10-CM

## 2024-02-21 DIAGNOSIS — Z12.31 OTHER SCREENING MAMMOGRAM: Primary | ICD-10-CM

## 2024-02-21 LAB
ALBUMIN SERPL-MCNC: 4.3 G/DL (ref 3.5–5.2)
ALBUMIN/GLOB SERPL: 1 {RATIO} (ref 1–2.5)
ALP SERPL-CCNC: 110 U/L (ref 35–104)
ALT SERPL-CCNC: 9 U/L (ref 5–33)
ANION GAP SERPL CALCULATED.3IONS-SCNC: 15 MMOL/L (ref 9–17)
AST SERPL-CCNC: 17 U/L
BACTERIA URNS QL MICRO: ABNORMAL
BASOPHILS # BLD: 0.05 K/UL (ref 0–0.2)
BASOPHILS NFR BLD: 1 % (ref 0–2)
BILIRUB SERPL-MCNC: 0.5 MG/DL (ref 0.3–1.2)
BILIRUB UR QL STRIP: NEGATIVE
BUN SERPL-MCNC: 22 MG/DL (ref 8–23)
BUN/CREAT SERPL: 20 (ref 9–20)
CALCIUM SERPL-MCNC: 9.5 MG/DL (ref 8.6–10.4)
CHLORIDE SERPL-SCNC: 102 MMOL/L (ref 98–107)
CLARITY UR: CLEAR
CO2 SERPL-SCNC: 24 MMOL/L (ref 20–31)
COLOR UR: YELLOW
CREAT SERPL-MCNC: 1.1 MG/DL (ref 0.5–0.9)
EKG ATRIAL RATE: 63 BPM
EKG P AXIS: 50 DEGREES
EKG P-R INTERVAL: 172 MS
EKG Q-T INTERVAL: 454 MS
EKG QRS DURATION: 92 MS
EKG QTC CALCULATION (BAZETT): 464 MS
EKG R AXIS: 1 DEGREES
EKG T AXIS: 117 DEGREES
EKG VENTRICULAR RATE: 63 BPM
EOSINOPHIL # BLD: 0.07 K/UL (ref 0–0.44)
EOSINOPHILS RELATIVE PERCENT: 1 % (ref 1–4)
EPI CELLS #/AREA URNS HPF: ABNORMAL /HPF (ref 0–25)
ERYTHROCYTE [DISTWIDTH] IN BLOOD BY AUTOMATED COUNT: 13.4 % (ref 11.8–14.4)
GFR SERPL CREATININE-BSD FRML MDRD: 57 ML/MIN/1.73M2
GLUCOSE BLD-MCNC: 117 MG/DL (ref 74–100)
GLUCOSE SERPL-MCNC: 115 MG/DL (ref 70–99)
GLUCOSE UR STRIP-MCNC: NEGATIVE MG/DL
HCT VFR BLD AUTO: 42.1 % (ref 36.3–47.1)
HGB BLD-MCNC: 13.9 G/DL (ref 11.9–15.1)
HGB UR QL STRIP.AUTO: ABNORMAL
IMM GRANULOCYTES # BLD AUTO: <0.03 K/UL (ref 0–0.3)
IMM GRANULOCYTES NFR BLD: 0 %
INR PPP: 1
KETONES UR STRIP-MCNC: NEGATIVE MG/DL
LEUKOCYTE ESTERASE UR QL STRIP: NEGATIVE
LYMPHOCYTES NFR BLD: 1.4 K/UL (ref 1.1–3.7)
LYMPHOCYTES RELATIVE PERCENT: 25 % (ref 24–43)
MAGNESIUM SERPL-MCNC: 1.9 MG/DL (ref 1.6–2.6)
MCH RBC QN AUTO: 29.9 PG (ref 25.2–33.5)
MCHC RBC AUTO-ENTMCNC: 33 G/DL (ref 28.4–34.8)
MCV RBC AUTO: 90.5 FL (ref 82.6–102.9)
MONOCYTES NFR BLD: 0.32 K/UL (ref 0.1–1.2)
MONOCYTES NFR BLD: 6 % (ref 3–12)
NEUTROPHILS NFR BLD: 67 % (ref 36–65)
NEUTS SEG NFR BLD: 3.87 K/UL (ref 1.5–8.1)
NITRITE UR QL STRIP: NEGATIVE
NRBC BLD-RTO: 0 PER 100 WBC
PH UR STRIP: 6 [PH] (ref 5–9)
PLATELET # BLD AUTO: 265 K/UL (ref 138–453)
PMV BLD AUTO: 10.4 FL (ref 8.1–13.5)
POTASSIUM SERPL-SCNC: 3.6 MMOL/L (ref 3.7–5.3)
PROT SERPL-MCNC: 8.4 G/DL (ref 6.4–8.3)
PROT UR STRIP-MCNC: NEGATIVE MG/DL
PROTHROMBIN TIME: 13 SEC (ref 11.9–14.8)
RBC # BLD AUTO: 4.65 M/UL (ref 3.95–5.11)
RBC #/AREA URNS HPF: ABNORMAL /HPF (ref 0–2)
SODIUM SERPL-SCNC: 141 MMOL/L (ref 135–144)
SP GR UR STRIP: 1.02 (ref 1.01–1.02)
TROPONIN I SERPL HS-MCNC: 10 NG/L (ref 0–14)
TSH SERPL DL<=0.05 MIU/L-ACNC: 3.17 UIU/ML (ref 0.3–5)
UROBILINOGEN UR STRIP-ACNC: NORMAL EU/DL (ref 0–1)
WBC #/AREA URNS HPF: ABNORMAL /HPF (ref 0–5)
WBC OTHER # BLD: 5.7 K/UL (ref 3.5–11.3)

## 2024-02-21 PROCEDURE — 70450 CT HEAD/BRAIN W/O DYE: CPT

## 2024-02-21 PROCEDURE — 85610 PROTHROMBIN TIME: CPT

## 2024-02-21 PROCEDURE — 99999 PR OFFICE/OUTPT VISIT,PROCEDURE ONLY: CPT | Performed by: NURSE PRACTITIONER

## 2024-02-21 PROCEDURE — 85025 COMPLETE CBC W/AUTO DIFF WBC: CPT

## 2024-02-21 PROCEDURE — 93010 ELECTROCARDIOGRAM REPORT: CPT | Performed by: INTERNAL MEDICINE

## 2024-02-21 PROCEDURE — 99285 EMERGENCY DEPT VISIT HI MDM: CPT

## 2024-02-21 PROCEDURE — 84443 ASSAY THYROID STIM HORMONE: CPT

## 2024-02-21 PROCEDURE — 82947 ASSAY GLUCOSE BLOOD QUANT: CPT

## 2024-02-21 PROCEDURE — 70498 CT ANGIOGRAPHY NECK: CPT

## 2024-02-21 PROCEDURE — 93005 ELECTROCARDIOGRAM TRACING: CPT | Performed by: EMERGENCY MEDICINE

## 2024-02-21 PROCEDURE — 83735 ASSAY OF MAGNESIUM: CPT

## 2024-02-21 PROCEDURE — 81001 URINALYSIS AUTO W/SCOPE: CPT

## 2024-02-21 PROCEDURE — 84484 ASSAY OF TROPONIN QUANT: CPT

## 2024-02-21 PROCEDURE — 80053 COMPREHEN METABOLIC PANEL: CPT

## 2024-02-21 PROCEDURE — 6360000004 HC RX CONTRAST MEDICATION: Performed by: EMERGENCY MEDICINE

## 2024-02-21 RX ORDER — IBUPROFEN 200 MG
200 TABLET ORAL EVERY 6 HOURS PRN
COMMUNITY

## 2024-02-21 RX ORDER — FLUTICASONE PROPIONATE 50 MCG
1 SPRAY, SUSPENSION (ML) NASAL DAILY
COMMUNITY

## 2024-02-21 RX ORDER — ACETAMINOPHEN 500 MG
500 TABLET ORAL EVERY 6 HOURS PRN
COMMUNITY

## 2024-02-21 RX ADMIN — IOPAMIDOL 75 ML: 755 INJECTION, SOLUTION INTRAVENOUS at 11:51

## 2024-02-21 SDOH — ECONOMIC STABILITY: FOOD INSECURITY: WITHIN THE PAST 12 MONTHS, YOU WORRIED THAT YOUR FOOD WOULD RUN OUT BEFORE YOU GOT MONEY TO BUY MORE.: NEVER TRUE

## 2024-02-21 SDOH — ECONOMIC STABILITY: HOUSING INSECURITY
IN THE LAST 12 MONTHS, WAS THERE A TIME WHEN YOU DID NOT HAVE A STEADY PLACE TO SLEEP OR SLEPT IN A SHELTER (INCLUDING NOW)?: NO

## 2024-02-21 SDOH — ECONOMIC STABILITY: FOOD INSECURITY: WITHIN THE PAST 12 MONTHS, THE FOOD YOU BOUGHT JUST DIDN'T LAST AND YOU DIDN'T HAVE MONEY TO GET MORE.: NEVER TRUE

## 2024-02-21 SDOH — ECONOMIC STABILITY: INCOME INSECURITY: HOW HARD IS IT FOR YOU TO PAY FOR THE VERY BASICS LIKE FOOD, HOUSING, MEDICAL CARE, AND HEATING?: NOT HARD AT ALL

## 2024-02-21 ASSESSMENT — PATIENT HEALTH QUESTIONNAIRE - PHQ9
4. FEELING TIRED OR HAVING LITTLE ENERGY: 2
9. THOUGHTS THAT YOU WOULD BE BETTER OFF DEAD, OR OF HURTING YOURSELF: 0
SUM OF ALL RESPONSES TO PHQ QUESTIONS 1-9: 15
8. MOVING OR SPEAKING SO SLOWLY THAT OTHER PEOPLE COULD HAVE NOTICED. OR THE OPPOSITE, BEING SO FIGETY OR RESTLESS THAT YOU HAVE BEEN MOVING AROUND A LOT MORE THAN USUAL: 1
6. FEELING BAD ABOUT YOURSELF - OR THAT YOU ARE A FAILURE OR HAVE LET YOURSELF OR YOUR FAMILY DOWN: 3
7. TROUBLE CONCENTRATING ON THINGS, SUCH AS READING THE NEWSPAPER OR WATCHING TELEVISION: 3
2. FEELING DOWN, DEPRESSED OR HOPELESS: 2
5. POOR APPETITE OR OVEREATING: 0
10. IF YOU CHECKED OFF ANY PROBLEMS, HOW DIFFICULT HAVE THESE PROBLEMS MADE IT FOR YOU TO DO YOUR WORK, TAKE CARE OF THINGS AT HOME, OR GET ALONG WITH OTHER PEOPLE: 3
SUM OF ALL RESPONSES TO PHQ9 QUESTIONS 1 & 2: 4
SUM OF ALL RESPONSES TO PHQ QUESTIONS 1-9: 15
3. TROUBLE FALLING OR STAYING ASLEEP: 2
1. LITTLE INTEREST OR PLEASURE IN DOING THINGS: 2

## 2024-02-21 ASSESSMENT — PAIN - FUNCTIONAL ASSESSMENT: PAIN_FUNCTIONAL_ASSESSMENT: NONE - DENIES PAIN

## 2024-02-21 NOTE — PATIENT INSTRUCTIONS
SURVEY:     You may be receiving a survey from [facility] [employer] regarding your visit today.     Please complete the survey to enable us to provide the highest quality of care to you and your family.     If you cannot score us a very good on any question, please call the office to discuss how we could have made your experience a very good one.     Thank you,    Moncho López, APRN-CNP  Lashon Caceres, APRN-CNP  Sosa, LPN  Makenzie, CMA  Augusto, CMA  Sofia, CMA  Kinga, PCA  Kristin, CMA  Karolina, PM

## 2024-02-21 NOTE — PROGRESS NOTES
notified niece per pt's permission. Pt resides with niece and niece reports that pt has these non-responsive \"episodes\" when she is \"not getting her way.\" Pt and niece recently had a conflict about pt doing her \"chores.\"      Per niece, multiple MDs have referred pt to \"psych\" for \"non-epileptic seizures.\"

## 2024-02-21 NOTE — ED PROVIDER NOTES
EKG's are interpreted by the Emergency Department Physician who either signs or Co-signs this chart in the absence of a cardiologist.    Normal sinus rhythm with a heart rate of 63.  T wave inversions in leads V1 through V5 which were present in her previous EKG.    RADIOLOGY:   Non-plain film images such as CT, Ultrasound and MRI are read by the radiologist. Plain radiographic images are visualized and preliminarily interpreted by the emergency physician with the below findings:        Interpretation per the Radiologist below, if available at the time of this note:    CTA HEAD NECK W CONTRAST   Final Result   1. No acute intracranial abnormality.   2. No acute abnormality or flow-limiting stenosis of the major arteries of   the head and neck.   3. 1.5 cm left thyroid nodule.  Follow-up recommendation is listed below.   4. Ground-glass attenuation at the lung apices, likely related to mild   pulmonary vascular congestion.      RECOMMENDATIONS:   Further evaluation by thyroid Ultrasound recommended for these incidental   nodules:      ~Age 35 years or more - Nodule 1.5 cm in size or greater.      Recommendations for f/u of Incidental Thyroid Nodules (ITN) found on CT, MR,   NM and Extrathyroidal US are based upon the ACR white paper and [facility] 3-tiered   system for managing ITNs:J Am Melina Radiol. 2015 Feb;12(2): 143-50         CT Head W/O Contrast   Final Result   1. No acute intracranial abnormality.   2. No acute abnormality or flow-limiting stenosis of the major arteries of   the head and neck.   3. 1.5 cm left thyroid nodule.  Follow-up recommendation is listed below.   4. Ground-glass attenuation at the lung apices, likely related to mild   pulmonary vascular congestion.      RECOMMENDATIONS:   Further evaluation by thyroid Ultrasound recommended for these incidental   nodules:      ~Age 35 years or more - Nodule 1.5 cm in size or greater.      Recommendations for f/u of Incidental Thyroid Nodules (ITN) found on

## 2024-02-21 NOTE — PROGRESS NOTES
[facility]  BASSEM GARCIA CNP  [facility]  [address] [place] [id number]  Dept: [phone]  Dept Fax: [phone]      Name: Jazmin Hernandez  : 1960         Chief Complaint:     Chief Complaint   Patient presents with    New Patient     Establish care. Previous PCP-Dr Babcock.    Migraine     Patient has had migraines for a while and her previous PCP only prescribed Topamax.     Numbness     Patient c/o leg numbness.     Back Pain     Patient c/o back pain that radiates to her chest. Patient c/o the chest pain x 1 month. Patient does not have a Cardiologist.     Seizures     Patient diagnosed with epileptic seizures in [facility]. Patient has not followed up with Neuro yet.        History of Present Illness:      Jazmin Hernandez is a 63 y.o.  female who presents with New Patient (Establish care. Previous PCP-Dr Babcock.), Migraine (Patient has had migraines for a while and her previous PCP only prescribed Topamax. ), Numbness (Patient c/o leg numbness. ), Back Pain (Patient c/o back pain that radiates to her chest. Patient c/o the chest pain x 1 month. Patient does not have a Cardiologist. ), and Seizures (Patient diagnosed with epileptic seizures in [facility]. Patient has not followed up with Neuro yet. )      HPI      Past Medical History:     Past Medical History:   Diagnosis Date    Anxiety     Arthritis     Cerebral artery occlusion with cerebral infarction (HCC)     Depression     Diabetes mellitus (HCC)     History of blood transfusion     Hyperlipidemia     Hypertension     Irregular heartbeat     age 23, diagnosed after stroke      Reviewed all health maintenance requirements and ordered appropriate tests  Health Maintenance Due   Topic Date Due    Lipids  Never done    Depression Monitoring  Never done    Cervical cancer screen  Never done    Colorectal Cancer Screen  Never done    Breast cancer screen  Never done    Shingles

## 2024-02-23 ENCOUNTER — HOSPITAL ENCOUNTER (EMERGENCY)
Age: 64
Discharge: PSYCHIATRIC OR SUBSTANCE USE FACILITY | End: 2024-02-23
Payer: MEDICARE

## 2024-02-23 VITALS
HEART RATE: 93 BPM | BODY MASS INDEX: 40.48 KG/M2 | TEMPERATURE: 100.1 F | DIASTOLIC BLOOD PRESSURE: 76 MMHG | OXYGEN SATURATION: 95 % | WEIGHT: 220 LBS | SYSTOLIC BLOOD PRESSURE: 131 MMHG | RESPIRATION RATE: 18 BRPM | HEIGHT: 62 IN

## 2024-02-23 DIAGNOSIS — F39 MOOD DISORDER (HCC): Primary | ICD-10-CM

## 2024-02-23 DIAGNOSIS — R45.850 HOMICIDAL IDEATIONS: ICD-10-CM

## 2024-02-23 LAB
ALBUMIN SERPL-MCNC: 4.2 G/DL (ref 3.5–5.2)
ALBUMIN/GLOB SERPL: 1 {RATIO} (ref 1–2.5)
ALP SERPL-CCNC: 104 U/L (ref 35–104)
ALT SERPL-CCNC: 8 U/L (ref 5–33)
AMORPH SED URNS QL MICRO: ABNORMAL
AMPHET UR QL SCN: NEGATIVE
ANION GAP SERPL CALCULATED.3IONS-SCNC: 13 MMOL/L (ref 9–17)
APAP SERPL-MCNC: <5 UG/ML (ref 10–30)
AST SERPL-CCNC: 17 U/L
BACTERIA URNS QL MICRO: ABNORMAL
BARBITURATES UR QL SCN: NEGATIVE
BASOPHILS # BLD: 0.04 K/UL (ref 0–0.2)
BASOPHILS NFR BLD: 1 % (ref 0–2)
BENZODIAZ UR QL: NEGATIVE
BILIRUB SERPL-MCNC: 0.5 MG/DL (ref 0.3–1.2)
BILIRUB UR QL STRIP: ABNORMAL
BUN SERPL-MCNC: 25 MG/DL (ref 8–23)
BUN/CREAT SERPL: 25 (ref 9–20)
BUPRENORPHINE UR QL: NEGATIVE
CALCIUM SERPL-MCNC: 9.5 MG/DL (ref 8.6–10.4)
CANNABINOIDS UR QL SCN: NEGATIVE
CHLORIDE SERPL-SCNC: 105 MMOL/L (ref 98–107)
CLARITY UR: ABNORMAL
CO2 SERPL-SCNC: 23 MMOL/L (ref 20–31)
COCAINE UR QL SCN: NEGATIVE
COLOR UR: YELLOW
CREAT SERPL-MCNC: 1 MG/DL (ref 0.5–0.9)
CRYSTALS URNS MICRO: ABNORMAL /HPF
EOSINOPHIL # BLD: 0.09 K/UL (ref 0–0.44)
EOSINOPHILS RELATIVE PERCENT: 1 % (ref 1–4)
EPI CELLS #/AREA URNS HPF: ABNORMAL /HPF (ref 0–25)
ERYTHROCYTE [DISTWIDTH] IN BLOOD BY AUTOMATED COUNT: 13.6 % (ref 11.8–14.4)
ETHANOL PERCENT: <0.01 %
ETHANOLAMINE SERPL-MCNC: <10 MG/DL
FENTANYL UR QL: NEGATIVE
GFR SERPL CREATININE-BSD FRML MDRD: >60 ML/MIN/1.73M2
GLUCOSE SERPL-MCNC: 137 MG/DL (ref 70–99)
GLUCOSE UR STRIP-MCNC: NEGATIVE MG/DL
HCT VFR BLD AUTO: 42.1 % (ref 36.3–47.1)
HGB BLD-MCNC: 13.9 G/DL (ref 11.9–15.1)
HGB UR QL STRIP.AUTO: NEGATIVE
IMM GRANULOCYTES # BLD AUTO: <0.03 K/UL (ref 0–0.3)
IMM GRANULOCYTES NFR BLD: 0 %
KETONES UR STRIP-MCNC: ABNORMAL MG/DL
LEUKOCYTE ESTERASE UR QL STRIP: ABNORMAL
LYMPHOCYTES NFR BLD: 1.63 K/UL (ref 1.1–3.7)
LYMPHOCYTES RELATIVE PERCENT: 23 % (ref 24–43)
MAGNESIUM SERPL-MCNC: 1.9 MG/DL (ref 1.6–2.6)
MCH RBC QN AUTO: 29.8 PG (ref 25.2–33.5)
MCHC RBC AUTO-ENTMCNC: 33 G/DL (ref 28.4–34.8)
MCV RBC AUTO: 90.1 FL (ref 82.6–102.9)
METHADONE UR QL: NEGATIVE
MONOCYTES NFR BLD: 0.39 K/UL (ref 0.1–1.2)
MONOCYTES NFR BLD: 6 % (ref 3–12)
MUCOUS THREADS URNS QL MICRO: ABNORMAL
NEUTROPHILS NFR BLD: 69 % (ref 36–65)
NEUTS SEG NFR BLD: 4.97 K/UL (ref 1.5–8.1)
NITRITE UR QL STRIP: NEGATIVE
NRBC BLD-RTO: 0 PER 100 WBC
OPIATES UR QL SCN: NEGATIVE
OXYCODONE UR QL SCN: NEGATIVE
PCP UR QL SCN: NEGATIVE
PH UR STRIP: 6 [PH] (ref 5–9)
PLATELET # BLD AUTO: 275 K/UL (ref 138–453)
PMV BLD AUTO: 10.4 FL (ref 8.1–13.5)
POTASSIUM SERPL-SCNC: 3.6 MMOL/L (ref 3.7–5.3)
PROT SERPL-MCNC: 8.3 G/DL (ref 6.4–8.3)
PROT UR STRIP-MCNC: NEGATIVE MG/DL
RBC # BLD AUTO: 4.67 M/UL (ref 3.95–5.11)
RBC #/AREA URNS HPF: ABNORMAL /HPF (ref 0–2)
SALICYLATES SERPL-MCNC: <1 MG/DL (ref 3–10)
SARS-COV-2 RDRP RESP QL NAA+PROBE: NOT DETECTED
SODIUM SERPL-SCNC: 141 MMOL/L (ref 135–144)
SP GR UR STRIP: 1.02 (ref 1.01–1.02)
SPECIMEN DESCRIPTION: NORMAL
TEST INFORMATION: NORMAL
UROBILINOGEN UR STRIP-ACNC: NORMAL EU/DL (ref 0–1)
WBC #/AREA URNS HPF: ABNORMAL /HPF (ref 0–5)
WBC OTHER # BLD: 7.1 K/UL (ref 3.5–11.3)

## 2024-02-23 PROCEDURE — 93005 ELECTROCARDIOGRAM TRACING: CPT | Performed by: PHYSICIAN ASSISTANT

## 2024-02-23 PROCEDURE — 36415 COLL VENOUS BLD VENIPUNCTURE: CPT

## 2024-02-23 PROCEDURE — 99285 EMERGENCY DEPT VISIT HI MDM: CPT

## 2024-02-23 PROCEDURE — 80143 DRUG ASSAY ACETAMINOPHEN: CPT

## 2024-02-23 PROCEDURE — 80307 DRUG TEST PRSMV CHEM ANLYZR: CPT

## 2024-02-23 PROCEDURE — 87635 SARS-COV-2 COVID-19 AMP PRB: CPT

## 2024-02-23 PROCEDURE — 81001 URINALYSIS AUTO W/SCOPE: CPT

## 2024-02-23 PROCEDURE — 80053 COMPREHEN METABOLIC PANEL: CPT

## 2024-02-23 PROCEDURE — 80179 DRUG ASSAY SALICYLATE: CPT

## 2024-02-23 PROCEDURE — G0480 DRUG TEST DEF 1-7 CLASSES: HCPCS

## 2024-02-23 PROCEDURE — 85025 COMPLETE CBC W/AUTO DIFF WBC: CPT

## 2024-02-23 PROCEDURE — 6370000000 HC RX 637 (ALT 250 FOR IP): Performed by: PHYSICIAN ASSISTANT

## 2024-02-23 PROCEDURE — 83735 ASSAY OF MAGNESIUM: CPT

## 2024-02-23 RX ORDER — ACETAMINOPHEN 325 MG/1
650 TABLET ORAL ONCE
Status: COMPLETED | OUTPATIENT
Start: 2024-02-23 | End: 2024-02-23

## 2024-02-23 RX ORDER — ALPRAZOLAM 0.5 MG/1
0.5 TABLET ORAL NIGHTLY PRN
Status: DISCONTINUED | OUTPATIENT
Start: 2024-02-23 | End: 2024-02-23 | Stop reason: HOSPADM

## 2024-02-23 RX ADMIN — ALPRAZOLAM 0.5 MG: 0.5 TABLET ORAL at 13:47

## 2024-02-23 RX ADMIN — POTASSIUM BICARBONATE 40 MEQ: 782 TABLET, EFFERVESCENT ORAL at 15:05

## 2024-02-23 RX ADMIN — ACETAMINOPHEN 650 MG: 325 TABLET ORAL at 13:47

## 2024-02-23 ASSESSMENT — PAIN DESCRIPTION - LOCATION
LOCATION: HEAD
LOCATION: HEAD

## 2024-02-23 ASSESSMENT — PAIN SCALES - GENERAL
PAINLEVEL_OUTOF10: 9
PAINLEVEL_OUTOF10: 9

## 2024-02-23 ASSESSMENT — LIFESTYLE VARIABLES
HOW OFTEN DO YOU HAVE A DRINK CONTAINING ALCOHOL: NEVER
HOW MANY STANDARD DRINKS CONTAINING ALCOHOL DO YOU HAVE ON A TYPICAL DAY: PATIENT DOES NOT DRINK

## 2024-02-23 ASSESSMENT — ENCOUNTER SYMPTOMS
ABDOMINAL PAIN: 0
COUGH: 0
SHORTNESS OF BREATH: 0

## 2024-02-23 ASSESSMENT — PAIN - FUNCTIONAL ASSESSMENT: PAIN_FUNCTIONAL_ASSESSMENT: 0-10

## 2024-02-23 ASSESSMENT — PAIN DESCRIPTION - DESCRIPTORS: DESCRIPTORS: ACHING

## 2024-02-23 NOTE — ED PROVIDER NOTES
Surgical History:   Procedure Laterality Date    BACK SURGERY Bilateral 2005    lower lumbar, fragments in L4-L5         CURRENT MEDICATIONS       Previous Medications    ACETAMINOPHEN (TYLENOL) 500 MG TABLET    Take 1 tablet by mouth every 6 hours as needed for Pain    ALBUTEROL SULFATE HFA (VENTOLIN HFA) 108 (90 BASE) MCG/ACT INHALER    Inhale 2 puffs into the lungs every 6 hours as needed for Wheezing or Shortness of Breath    CHOLECALCIFEROL (VITAMIN D3) 50 MCG (2000 UT) CAPS    Take 1 capsule by mouth daily    FLUTICASONE (FLONASE) 50 MCG/ACT NASAL SPRAY    1 spray by Each Nostril route daily    FLUTICASONE-SALMETEROL (ADVAIR HFA) 115-21 MCG/ACT INHALER    Inhale 2 puffs into the lungs 2 times daily    FUROSEMIDE (LASIX) 20 MG TABLET    Take 1 tablet by mouth daily    IBUPROFEN (ADVIL;MOTRIN) 200 MG TABLET    Take 1 tablet by mouth every 6 hours as needed for Pain    KETOTIFEN (ZADITOR) 0.025 % OPHTHALMIC SOLUTION    1 drop 2 times daily    LOSARTAN (COZAAR) 25 MG TABLET    Take 2 tablets by mouth daily Take 2 tablets by mouth every day.    METFORMIN (GLUCOPHAGE) 500 MG TABLET    Take 1 tablet by mouth 2 times daily (with meals)    MONTELUKAST (SINGULAIR) 10 MG TABLET    Take 1 tablet by mouth nightly    NAPHAZOLINE-PHENIRAMINE (NAPHCON-A) 0.025-0.3 % OPHTHALMIC SOLUTION    Place 1 drop into both eyes 4 times daily    OMEPRAZOLE (PRILOSEC) 20 MG DELAYED RELEASE CAPSULE    Take 1 capsule by mouth Daily    SERTRALINE (ZOLOFT) 50 MG TABLET    Take 1 tablet by mouth daily    SIMVASTATIN (ZOCOR) 20 MG TABLET    Take 1 tablet by mouth nightly    TOPIRAMATE (TOPAMAX) 100 MG TABLET    Take 1 tablet by mouth 2 times daily       ALLERGIES       Patient has no known allergies.    FAMILY HISTORY       History reviewed. No pertinent family history.       SOCIAL HISTORY       Social History     Tobacco Use    Smoking status: Former     Current packs/day: 0.00     Average packs/day: 1 pack/day for 2.0 years (2.0 ttl pk-yrs)

## 2024-02-24 LAB
EKG ATRIAL RATE: 76 BPM
EKG P AXIS: 16 DEGREES
EKG P-R INTERVAL: 164 MS
EKG Q-T INTERVAL: 426 MS
EKG QRS DURATION: 84 MS
EKG QTC CALCULATION (BAZETT): 479 MS
EKG R AXIS: -19 DEGREES
EKG T AXIS: -63 DEGREES
EKG VENTRICULAR RATE: 76 BPM

## 2024-02-24 PROCEDURE — 93010 ELECTROCARDIOGRAM REPORT: CPT | Performed by: INTERNAL MEDICINE

## 2024-03-08 RX ORDER — SUMATRIPTAN 100 MG/1
100 TABLET, FILM COATED ORAL
COMMUNITY

## 2024-03-08 RX ORDER — LIDOCAINE 4 G/G
1 PATCH TOPICAL DAILY
COMMUNITY

## 2024-03-14 ENCOUNTER — OFFICE VISIT (OUTPATIENT)
Dept: PRIMARY CARE CLINIC | Age: 64
End: 2024-03-14
Payer: MEDICARE

## 2024-03-14 VITALS
DIASTOLIC BLOOD PRESSURE: 76 MMHG | OXYGEN SATURATION: 97 % | SYSTOLIC BLOOD PRESSURE: 114 MMHG | TEMPERATURE: 97.9 F | WEIGHT: 222.2 LBS | BODY MASS INDEX: 40.64 KG/M2 | HEART RATE: 72 BPM | RESPIRATION RATE: 16 BRPM

## 2024-03-14 DIAGNOSIS — K21.9 GASTROESOPHAGEAL REFLUX DISEASE WITHOUT ESOPHAGITIS: ICD-10-CM

## 2024-03-14 DIAGNOSIS — E66.01 OBESITY, CLASS III, BMI 40-49.9 (MORBID OBESITY) (HCC): ICD-10-CM

## 2024-03-14 DIAGNOSIS — E78.5 HYPERLIPIDEMIA, UNSPECIFIED HYPERLIPIDEMIA TYPE: ICD-10-CM

## 2024-03-14 DIAGNOSIS — Z12.11 COLON CANCER SCREENING: ICD-10-CM

## 2024-03-14 DIAGNOSIS — M19.91 PRIMARY OSTEOARTHRITIS, UNSPECIFIED SITE: ICD-10-CM

## 2024-03-14 DIAGNOSIS — F32.9 MAJOR DEPRESSIVE DISORDER, REMISSION STATUS UNSPECIFIED, UNSPECIFIED WHETHER RECURRENT: ICD-10-CM

## 2024-03-14 DIAGNOSIS — G43.809 OTHER MIGRAINE WITHOUT STATUS MIGRAINOSUS, NOT INTRACTABLE: ICD-10-CM

## 2024-03-14 DIAGNOSIS — Z86.73 HISTORY OF CVA (CEREBROVASCULAR ACCIDENT): ICD-10-CM

## 2024-03-14 DIAGNOSIS — I10 PRIMARY HYPERTENSION: ICD-10-CM

## 2024-03-14 DIAGNOSIS — E11.9 TYPE 2 DIABETES MELLITUS WITHOUT COMPLICATION, WITHOUT LONG-TERM CURRENT USE OF INSULIN (HCC): ICD-10-CM

## 2024-03-14 DIAGNOSIS — Z76.89 ENCOUNTER TO ESTABLISH CARE: Primary | ICD-10-CM

## 2024-03-14 DIAGNOSIS — J30.2 SEASONAL ALLERGIES: ICD-10-CM

## 2024-03-14 DIAGNOSIS — R94.31 ABNORMAL EKG: ICD-10-CM

## 2024-03-14 DIAGNOSIS — M54.41 ACUTE MIDLINE LOW BACK PAIN WITH RIGHT-SIDED SCIATICA: ICD-10-CM

## 2024-03-14 LAB — HBA1C MFR BLD: 6.1 %

## 2024-03-14 PROCEDURE — 3044F HG A1C LEVEL LT 7.0%: CPT | Performed by: NURSE PRACTITIONER

## 2024-03-14 PROCEDURE — 3078F DIAST BP <80 MM HG: CPT | Performed by: NURSE PRACTITIONER

## 2024-03-14 PROCEDURE — 83036 HEMOGLOBIN GLYCOSYLATED A1C: CPT | Performed by: NURSE PRACTITIONER

## 2024-03-14 PROCEDURE — 3074F SYST BP LT 130 MM HG: CPT | Performed by: NURSE PRACTITIONER

## 2024-03-14 PROCEDURE — G8484 FLU IMMUNIZE NO ADMIN: HCPCS | Performed by: NURSE PRACTITIONER

## 2024-03-14 PROCEDURE — 3017F COLORECTAL CA SCREEN DOC REV: CPT | Performed by: NURSE PRACTITIONER

## 2024-03-14 PROCEDURE — 2022F DILAT RTA XM EVC RTNOPTHY: CPT | Performed by: NURSE PRACTITIONER

## 2024-03-14 PROCEDURE — G8427 DOCREV CUR MEDS BY ELIG CLIN: HCPCS | Performed by: NURSE PRACTITIONER

## 2024-03-14 PROCEDURE — 1036F TOBACCO NON-USER: CPT | Performed by: NURSE PRACTITIONER

## 2024-03-14 PROCEDURE — G8417 CALC BMI ABV UP PARAM F/U: HCPCS | Performed by: NURSE PRACTITIONER

## 2024-03-14 PROCEDURE — 99204 OFFICE O/P NEW MOD 45 MIN: CPT | Performed by: NURSE PRACTITIONER

## 2024-03-14 RX ORDER — MELOXICAM 15 MG/1
15 TABLET ORAL DAILY
Qty: 90 TABLET | Refills: 1 | Status: SHIPPED | OUTPATIENT
Start: 2024-03-14

## 2024-03-14 ASSESSMENT — PATIENT HEALTH QUESTIONNAIRE - PHQ9
SUM OF ALL RESPONSES TO PHQ QUESTIONS 1-9: 0
SUM OF ALL RESPONSES TO PHQ9 QUESTIONS 1 & 2: 0
5. POOR APPETITE OR OVEREATING: 0
3. TROUBLE FALLING OR STAYING ASLEEP: 0
9. THOUGHTS THAT YOU WOULD BE BETTER OFF DEAD, OR OF HURTING YOURSELF: 0
SUM OF ALL RESPONSES TO PHQ QUESTIONS 1-9: 0
SUM OF ALL RESPONSES TO PHQ QUESTIONS 1-9: 0
4. FEELING TIRED OR HAVING LITTLE ENERGY: 0
2. FEELING DOWN, DEPRESSED OR HOPELESS: 0
10. IF YOU CHECKED OFF ANY PROBLEMS, HOW DIFFICULT HAVE THESE PROBLEMS MADE IT FOR YOU TO DO YOUR WORK, TAKE CARE OF THINGS AT HOME, OR GET ALONG WITH OTHER PEOPLE: 0
6. FEELING BAD ABOUT YOURSELF - OR THAT YOU ARE A FAILURE OR HAVE LET YOURSELF OR YOUR FAMILY DOWN: 0
8. MOVING OR SPEAKING SO SLOWLY THAT OTHER PEOPLE COULD HAVE NOTICED. OR THE OPPOSITE, BEING SO FIGETY OR RESTLESS THAT YOU HAVE BEEN MOVING AROUND A LOT MORE THAN USUAL: 0
SUM OF ALL RESPONSES TO PHQ QUESTIONS 1-9: 0
7. TROUBLE CONCENTRATING ON THINGS, SUCH AS READING THE NEWSPAPER OR WATCHING TELEVISION: 0
1. LITTLE INTEREST OR PLEASURE IN DOING THINGS: 0

## 2024-03-14 ASSESSMENT — ENCOUNTER SYMPTOMS
BACK PAIN: 1
GASTROINTESTINAL NEGATIVE: 1
RESPIRATORY NEGATIVE: 1
ALLERGIC/IMMUNOLOGIC NEGATIVE: 1
EYES NEGATIVE: 1

## 2024-03-14 NOTE — PATIENT INSTRUCTIONS
SURVEY:     You may be receiving a survey from [facility] [employer] regarding your visit today.     Please complete the survey to enable us to provide the highest quality of care to you and your family.     If you cannot score us a very good on any question, please call the office to discuss how we could have made your experience a very good one.     Thank you,    Moncho López, APRN-CNP  Lashon Caceres, APRN-CNP  Sosa, LPN  Makenzie, CMA  Augusto, CMA  Sofia, CMA  Kinga, PCA  Kristin, CMA  Karolina, PM

## 2024-03-14 NOTE — PROGRESS NOTES
[facility]  BASSEM GARCIA CNP  [facility]  [address] [address]  Dept: [phone]  Dept Fax: [phone]      Name: Jazmin Hernandez  : 1960         Chief Complaint:     Chief Complaint   Patient presents with    New Patient     Establish care.     Follow-Up from Hospital     Patient here for a hospital follow up. Patient was admitted to the hospital and then sent to [facility]. Patient was in there for 2 weeks.        History of Present Illness:      Jazmin Hernandez is a 63 y.o.  female who presents with New Patient (Establish care. ) and Follow-Up from Hospital (Patient here for a hospital follow up. Patient was admitted to the hospital and then sent to [facility]. Patient was in there for 2 weeks. )      PEG Wu is here today to establish care.  She was recently at [facility] for her mental health. She has a previous provider in .  She has a history of migraines that she takes theTopamax for.  She states it was working well until about a year ago and she started getting migraines again.   She states she was getting the migraines every day that would last all day.  While at [facility] they increased her Zoloft and started Imitrex that have improved her migraines.    She was at [facility] and they increased her Zoloft.  She states she is feeling better.  She does not have any appointments for follow up with psychiatry.  She denies any increased depression or anxiety.    She has a history of having a CVA when she was 23 years old. She states she has a history of heart disease and she has a family history of heart disease.  She is requesting a referral to Cardiology.  She denies any heart palpitations or chest pain at this time.    She has has history of lumbar surgery in .  She states that she has had continued back pain and right sided numbness in her leg.  She has had injections in the past and followed up for this.  She states her surgeon is no longer practicing.  She

## 2024-03-18 LAB
ALBUMIN SERPL-MCNC: 4.2 G/DL
ALP BLD-CCNC: 88 U/L
ALT SERPL-CCNC: 11 U/L
AST SERPL-CCNC: 17 U/L
BILIRUB SERPL-MCNC: 0.5 MG/DL (ref 0.1–1.4)
BUN BLDV-MCNC: 25 MG/DL
CALCIUM SERPL-MCNC: 9.9 MG/DL
CHLORIDE BLD-SCNC: 105 MMOL/L
CHOLESTEROL, TOTAL: 136 MG/DL
CHOLESTEROL/HDL RATIO: 2.9
CO2: 28 MMOL/L
CREAT SERPL-MCNC: 1.02 MG/DL
GLUCOSE FASTING: 126 MG/DL
HDLC SERPL-MCNC: 47 MG/DL (ref 35–70)
LDL CHOLESTEROL CALCULATED: 69 MG/DL (ref 0–160)
NONHDLC SERPL-MCNC: ABNORMAL MG/DL
POTASSIUM SERPL-SCNC: 3.6 MMOL/L
SODIUM BLD-SCNC: 142 MMOL/L
TOTAL PROTEIN: 7.7 G/DL (ref 6.4–8.2)
TRIGL SERPL-MCNC: 101 MG/DL
VLDLC SERPL CALC-MCNC: 20 MG/DL

## 2024-03-19 ENCOUNTER — TELEPHONE (OUTPATIENT)
Dept: PRIMARY CARE CLINIC | Age: 64
End: 2024-03-19

## 2024-03-19 DIAGNOSIS — E11.9 TYPE 2 DIABETES MELLITUS WITHOUT COMPLICATION, WITHOUT LONG-TERM CURRENT USE OF INSULIN (HCC): ICD-10-CM

## 2024-03-19 DIAGNOSIS — I10 PRIMARY HYPERTENSION: ICD-10-CM

## 2024-03-19 DIAGNOSIS — E78.5 HYPERLIPIDEMIA, UNSPECIFIED HYPERLIPIDEMIA TYPE: ICD-10-CM

## 2024-03-19 NOTE — TELEPHONE ENCOUNTER
----- Message from PEPPER Swain - CNP sent at 3/19/2024 11:03 AM EDT -----  Please notify patient of stable lab results.  Thanks Lashon

## 2024-03-27 RX ORDER — ACETAMINOPHEN 160 MG
2000 TABLET,DISINTEGRATING ORAL DAILY
Qty: 90 CAPSULE | Refills: 0 | Status: SHIPPED | OUTPATIENT
Start: 2024-03-27

## 2024-03-27 RX ORDER — SUMATRIPTAN 100 MG/1
100 TABLET, FILM COATED ORAL
Qty: 9 TABLET | Refills: 0 | Status: SHIPPED | OUTPATIENT
Start: 2024-03-27 | End: 2024-03-27

## 2024-03-27 RX ORDER — MONTELUKAST SODIUM 10 MG/1
10 TABLET ORAL NIGHTLY
Qty: 90 TABLET | Refills: 0 | Status: SHIPPED | OUTPATIENT
Start: 2024-03-27

## 2024-03-27 NOTE — TELEPHONE ENCOUNTER
----- Message from Marta Santos sent at 3/27/2024 11:00 AM EDT -----  Subject: Refill Request    QUESTIONS  Name of Medication? sertraline (ZOLOFT) 50 MG tablet  Patient-reported dosage and instructions? 2 tablets daily  How many days do you have left? 2  Preferred Pharmacy? [id number]  Pharmacy phone number (if available)? [phone]  Additional Information for Provider? Pt calling as the RX is written for   her to take 100 MG and they have only given her the 50 MG so pt will be   out of meds on 3/27. Please call if more information is needed.  ---------------------------------------------------------------------------  --------------,  Name of Medication? Cholecalciferol (VITAMIN D3) 50 MCG (2000 UT) CAPS  Patient-reported dosage and instructions? 1 tablet daily  How many days do you have left? 3  Preferred Pharmacy? [id number]  Pharmacy phone number (if available)? [phone]  ---------------------------------------------------------------------------  --------------,  Name of Medication? SUMAtriptan (IMITREX) 100 MG tablet  Patient-reported dosage and instructions? 1 tablet as needed  How many days do you have left? 4  Preferred Pharmacy? [id number]  Pharmacy phone number (if available)? [phone]  ---------------------------------------------------------------------------  --------------,  Name of Medication? montelukast (SINGULAIR) 10 MG tablet  Patient-reported dosage and instructions? 1 Tablet daily  How many days do you have left? 3  Preferred Pharmacy? [id number]  Pharmacy phone number (if available)? [phone]  ---------------------------------------------------------------------------  --------------  CALL BACK INFO  What is the best way for the office to contact you? OK to leave message on   voicemail  Preferred Call Back Phone Number? [phone]  ---------------------------------------------------------------------------  --------------  SCRIPT

## 2024-04-09 RX ORDER — SUMATRIPTAN 100 MG/1
100 TABLET, FILM COATED ORAL
Qty: 9 TABLET | Refills: 0 | OUTPATIENT
Start: 2024-04-09 | End: 2024-04-09

## 2024-04-09 NOTE — TELEPHONE ENCOUNTER
Health Maintenance   Topic Date Due    Pneumococcal 0-64 years Vaccine (1 of 2 - PCV) Never done    Diabetic Alb to Cr ratio (uACR) test  Never done    Cervical cancer screen  Never done    Colorectal Cancer Screen  Never done    Breast cancer screen  Never done    Respiratory Syncytial Virus (RSV) Pregnant or age 60 yrs+ (1 - 1-dose 60+ series) Never done    Annual Wellness Visit (Medicare)  Never done    DTaP/Tdap/Td vaccine (1 - Tdap) 02/21/2025 (Originally 7/2/1981)    COVID-19 Vaccine (1) 02/21/2025 (Originally 1/2/1963)    Hepatitis C screen  02/21/2025 (Originally 7/2/1980)    HIV screen  02/21/2025 (Originally 7/2/1977)    Shingles vaccine (1 of 2) 03/14/2025 (Originally 7/2/2012)    GFR test (Diabetes, CKD 3-4, OR last GFR 15-59)  02/23/2025    A1C test (Diabetic or Prediabetic)  03/14/2025    Depression Monitoring  03/14/2025    Lipids  03/18/2025    Flu vaccine  Completed    Hepatitis A vaccine  Aged Out    Hepatitis B vaccine  Aged Out    Hib vaccine  Aged Out    Polio vaccine  Aged Out    Meningococcal (ACWY) vaccine  Aged Out    Depression Screen  Discontinued    Diabetes screen  Discontinued             (applicable per patient's age: Cancer Screenings, Depression Screening, Fall Risk Screening, Immunizations)    Hemoglobin A1C (%)   Date Value   03/14/2024 6.1   10/14/2023 6.2 (H)     LDL Calculated (mg/dL)   Date Value   03/18/2024 69     AST (U/L)   Date Value   03/18/2024 17     ALT (U/L)   Date Value   03/18/2024 11     BUN (mg/dL)   Date Value   03/18/2024 25      (goal A1C is < 7)   (goal LDL is <100) need 30-50% reduction from baseline     BP Readings from Last 3 Encounters:   03/14/24 114/76   02/23/24 131/76   02/21/24 (!) 106/55    (goal /80)      All Future Testing planned in [facility]:  Lab Frequency Next Occurrence   NADEEN KERVIN DIGITAL SCREEN BILATERAL Once 02/21/2024       Next Visit Date:  Future Appointments   Date Time Provider Department Center   4/22/2024  2:30 PM [facility]

## 2024-04-19 RX ORDER — LANCETS 30 GAUGE
1 EACH MISCELLANEOUS 2 TIMES DAILY
Qty: 300 EACH | Refills: 1 | Status: SHIPPED | OUTPATIENT
Start: 2024-04-19

## 2024-04-22 ENCOUNTER — OFFICE VISIT (OUTPATIENT)
Dept: CARDIOLOGY | Age: 64
End: 2024-04-22
Payer: MEDICARE

## 2024-04-22 ENCOUNTER — HOSPITAL ENCOUNTER (OUTPATIENT)
Age: 64
Discharge: HOME | End: 2024-04-24
Payer: MEDICARE

## 2024-04-22 VITALS
RESPIRATION RATE: 18 BRPM | SYSTOLIC BLOOD PRESSURE: 126 MMHG | HEART RATE: 73 BPM | DIASTOLIC BLOOD PRESSURE: 74 MMHG | HEIGHT: 64 IN | BODY MASS INDEX: 37.39 KG/M2 | OXYGEN SATURATION: 99 % | WEIGHT: 219 LBS

## 2024-04-22 DIAGNOSIS — R07.89 ATYPICAL CHEST PAIN: ICD-10-CM

## 2024-04-22 DIAGNOSIS — I10 PRIMARY HYPERTENSION: ICD-10-CM

## 2024-04-22 DIAGNOSIS — R06.02 SOB (SHORTNESS OF BREATH): ICD-10-CM

## 2024-04-22 DIAGNOSIS — G47.33 OSA (OBSTRUCTIVE SLEEP APNEA): ICD-10-CM

## 2024-04-22 DIAGNOSIS — E78.2 MIXED HYPERLIPIDEMIA: ICD-10-CM

## 2024-04-22 DIAGNOSIS — R42 LIGHTHEADED: ICD-10-CM

## 2024-04-22 DIAGNOSIS — E11.9 TYPE 2 DIABETES MELLITUS WITHOUT COMPLICATION, WITHOUT LONG-TERM CURRENT USE OF INSULIN (HCC): ICD-10-CM

## 2024-04-22 DIAGNOSIS — R42 DIZZINESS: ICD-10-CM

## 2024-04-22 LAB — ECHO BSA: 2.12 M2

## 2024-04-22 PROCEDURE — 2022F DILAT RTA XM EVC RTNOPTHY: CPT | Performed by: PHYSICIAN ASSISTANT

## 2024-04-22 PROCEDURE — 3074F SYST BP LT 130 MM HG: CPT | Performed by: PHYSICIAN ASSISTANT

## 2024-04-22 PROCEDURE — G8417 CALC BMI ABV UP PARAM F/U: HCPCS | Performed by: PHYSICIAN ASSISTANT

## 2024-04-22 PROCEDURE — 3017F COLORECTAL CA SCREEN DOC REV: CPT | Performed by: PHYSICIAN ASSISTANT

## 2024-04-22 PROCEDURE — 3078F DIAST BP <80 MM HG: CPT | Performed by: PHYSICIAN ASSISTANT

## 2024-04-22 PROCEDURE — 1036F TOBACCO NON-USER: CPT | Performed by: PHYSICIAN ASSISTANT

## 2024-04-22 PROCEDURE — G8427 DOCREV CUR MEDS BY ELIG CLIN: HCPCS | Performed by: PHYSICIAN ASSISTANT

## 2024-04-22 PROCEDURE — 93243 EXT ECG>48HR<7D SCAN A/R: CPT

## 2024-04-22 PROCEDURE — 99204 OFFICE O/P NEW MOD 45 MIN: CPT | Performed by: PHYSICIAN ASSISTANT

## 2024-04-22 PROCEDURE — 3044F HG A1C LEVEL LT 7.0%: CPT | Performed by: PHYSICIAN ASSISTANT

## 2024-04-22 NOTE — PROGRESS NOTES
03/18/2024     03/18/2024    K 3.6 03/18/2024     03/18/2024    CREATININE 1.02 03/18/2024    BUN 25 03/18/2024    CO2 28 03/18/2024    TSH 3.17 02/21/2024    INR 1.0 02/21/2024    GLUF 126 03/18/2024    LABA1C 6.1 03/14/2024       ASSESSMENT:     1. Atypical chest pain    2. Lightheaded    3. Dizziness    4. SOB (shortness of breath)    5. Primary hypertension    6. Mixed hyperlipidemia    7. Type 2 diabetes mellitus without complication, without long-term current use of insulin (HCC)    8. MELANI (obstructive sleep apnea)         PLAN:      Atypical chest pain but still suspicious for possible myocardial ischemia:  Risk factors include hypertension, hyperlipidemia, diabetes, tobacco abuse, and family history of premature CAD.  No CAD seen on heart cath in 2017.  She reports sharp chest pain on the left side lasting seconds at a time.  She also reports worsening shortness of breath and new lightheaded/dizziness.  Medical management for possible Coronary artery disease:  Antiplatelet Agent: Not indicated at this time.   Beta Blocker: Not indicated at this time.   Anti-anginal medications: Not indicated at this time.  Cholesterol Reduction Therapy: Continue simvastatin (Zocor) 20 mg daily.    Additional Testing List: I ordered a echocardiogram to better assess for the etiology of this problem and to help guide future management and Because current signs and symptoms can certainly be caused by significant coronary artery disease, I ordered a Regadenoson (Lexiscan) stress test with SPECT imaging to try and rule out this possibility.   Additional counseling: I advised them to call our office or go to the emergency room if they developed worsening or persistent chest pain or increased shortness of breath as this could be life threatening.    Lightheadedness/dizziness:  Pharmacologic Therapy: Not indicated at this time.  Nonpharmacologic counseling: Because of her condition, I reminded her to try and keep herself

## 2024-04-23 DIAGNOSIS — Z79.4 TYPE 2 DIABETES MELLITUS WITHOUT COMPLICATION, WITH LONG-TERM CURRENT USE OF INSULIN (HCC): Primary | ICD-10-CM

## 2024-04-23 DIAGNOSIS — E11.9 TYPE 2 DIABETES MELLITUS WITHOUT COMPLICATION, WITHOUT LONG-TERM CURRENT USE OF INSULIN (HCC): ICD-10-CM

## 2024-04-23 DIAGNOSIS — E11.9 TYPE 2 DIABETES MELLITUS WITHOUT COMPLICATION, WITH LONG-TERM CURRENT USE OF INSULIN (HCC): Primary | ICD-10-CM

## 2024-04-23 RX ORDER — LANCETS 30 GAUGE
EACH MISCELLANEOUS
Qty: 300 EACH | Refills: 1 | Status: SHIPPED | OUTPATIENT
Start: 2024-04-23

## 2024-04-23 NOTE — TELEPHONE ENCOUNTER
Echo (TTE) complete (PRN contrast/bubble/strain/3D) Once 04/22/2024   Nuclear stress test with myocardial perfusion Once 04/22/2024       Next Visit Date:  Future Appointments   Date Time Provider Department Center   5/16/2024  9:15 AM [facility]   5/16/2024 10:00 AM [facility]   5/16/2024 10:15 AM [facility] NUCLEAR ROOM [facility] NUC MED [facility]   5/17/2024  1:00 PM [facility]   5/17/2024  1:45 PM [facility]   5/29/2024  3:30 PM [facility] MAMMOGRAPHY ROOM AT [facility] WOMENS [facility]   6/4/2024 11:00 AM Jasmin Horton APRN - CNP TIFF GI [facility]   6/17/2024 10:40 AM Lashon Caceres APRN - CNP [facility]   6/26/2024  2:40 PM Josiah Fall MD TIFF CARD [facility]            Patient Active Problem List:     Acute delirium     HTN (hypertension)     GERD (gastroesophageal reflux disease)     Generalized weakness     Rhinorrhea     Fatigue     Stroke-like symptoms     Staring episodes     Seizure-like activity (HCC)

## 2024-04-30 DIAGNOSIS — E78.5 HYPERLIPIDEMIA, UNSPECIFIED HYPERLIPIDEMIA TYPE: Primary | ICD-10-CM

## 2024-04-30 DIAGNOSIS — G47.33 OSA (OBSTRUCTIVE SLEEP APNEA): Primary | ICD-10-CM

## 2024-04-30 RX ORDER — SIMVASTATIN 20 MG
20 TABLET ORAL NIGHTLY
Qty: 90 TABLET | Refills: 1 | Status: SHIPPED | OUTPATIENT
Start: 2024-04-30

## 2024-04-30 NOTE — TELEPHONE ENCOUNTER
Health Maintenance   Topic Date Due    Pneumococcal 0-64 years Vaccine (1 of 2 - PCV) Never done    Diabetic foot exam  Never done    Diabetic Alb to Cr ratio (uACR) test  Never done    Diabetic retinal exam  Never done    Cervical cancer screen  Never done    Colorectal Cancer Screen  Never done    Breast cancer screen  Never done    Respiratory Syncytial Virus (RSV) Pregnant or age 60 yrs+ (1 - 1-dose 60+ series) Never done    Annual Wellness Visit (Medicare)  Never done    DTaP/Tdap/Td vaccine (1 - Tdap) 02/21/2025 (Originally 7/2/1981)    COVID-19 Vaccine (1) 02/21/2025 (Originally 1/2/1963)    Hepatitis C screen  02/21/2025 (Originally 7/2/1980)    HIV screen  02/21/2025 (Originally 7/2/1977)    Shingles vaccine (1 of 2) 03/14/2025 (Originally 7/2/2012)    GFR test (Diabetes, CKD 3-4, OR last GFR 15-59)  02/23/2025    A1C test (Diabetic or Prediabetic)  03/14/2025    Depression Monitoring  03/14/2025    Lipids  03/18/2025    Flu vaccine  Completed    Hepatitis A vaccine  Aged Out    Hepatitis B vaccine  Aged Out    Hib vaccine  Aged Out    Polio vaccine  Aged Out    Meningococcal (ACWY) vaccine  Aged Out    Depression Screen  Discontinued    Diabetes screen  Discontinued             (applicable per patient's age: Cancer Screenings, Depression Screening, Fall Risk Screening, Immunizations)    Hemoglobin A1C (%)   Date Value   03/14/2024 6.1   10/14/2023 6.2 (H)     LDL Calculated (mg/dL)   Date Value   03/18/2024 69     AST (U/L)   Date Value   03/18/2024 17     ALT (U/L)   Date Value   03/18/2024 11     BUN (mg/dL)   Date Value   03/18/2024 25      (goal A1C is < 7)   (goal LDL is <100) need 30-50% reduction from baseline     BP Readings from Last 3 Encounters:   04/22/24 126/74   03/14/24 114/76   02/23/24 131/76    (goal /80)      All Future Testing planned in [facility]:  Lab Frequency Next Occurrence   NADEEN KERVIN DIGITAL SCREEN BILATERAL Once 02/21/2024   Sleep study with PAP titration Once 04/22/2024

## 2024-05-01 LAB — ECHO BSA: 2.12 M2

## 2024-05-02 ENCOUNTER — TELEPHONE (OUTPATIENT)
Dept: CARDIOLOGY | Age: 64
End: 2024-05-02

## 2024-05-02 NOTE — TELEPHONE ENCOUNTER
----- Message from Zenobia Doyle PA-C sent at 5/2/2024  8:50 AM EDT -----  Please let them know that their CAM monitor was overall unremarkable. We will discuss at their follow up appointment.

## 2024-05-16 ENCOUNTER — HOSPITAL ENCOUNTER (OUTPATIENT)
Dept: NUCLEAR MEDICINE | Age: 64
Discharge: HOME | End: 2024-05-18
Payer: MEDICARE

## 2024-05-16 ENCOUNTER — HOSPITAL ENCOUNTER (OUTPATIENT)
Age: 64
Discharge: HOME | End: 2024-05-18
Payer: MEDICARE

## 2024-05-16 VITALS
DIASTOLIC BLOOD PRESSURE: 74 MMHG | WEIGHT: 218.92 LBS | BODY MASS INDEX: 38.79 KG/M2 | HEIGHT: 63 IN | SYSTOLIC BLOOD PRESSURE: 126 MMHG

## 2024-05-16 DIAGNOSIS — R42 DIZZINESS: ICD-10-CM

## 2024-05-16 DIAGNOSIS — R42 LIGHTHEADED: ICD-10-CM

## 2024-05-16 DIAGNOSIS — G47.33 OSA (OBSTRUCTIVE SLEEP APNEA): ICD-10-CM

## 2024-05-16 DIAGNOSIS — E78.2 MIXED HYPERLIPIDEMIA: ICD-10-CM

## 2024-05-16 DIAGNOSIS — I10 PRIMARY HYPERTENSION: ICD-10-CM

## 2024-05-16 DIAGNOSIS — R07.89 ATYPICAL CHEST PAIN: ICD-10-CM

## 2024-05-16 DIAGNOSIS — E11.9 TYPE 2 DIABETES MELLITUS WITHOUT COMPLICATION, WITHOUT LONG-TERM CURRENT USE OF INSULIN (HCC): ICD-10-CM

## 2024-05-16 DIAGNOSIS — R06.02 SOB (SHORTNESS OF BREATH): ICD-10-CM

## 2024-05-16 LAB
ECHO AV CUSP MM: 2 CM
ECHO AV MEAN GRADIENT: 4 MMHG
ECHO AV MEAN VELOCITY: 1 M/S
ECHO AV PEAK GRADIENT: 7 MMHG
ECHO AV PEAK VELOCITY: 1.4 M/S
ECHO AV VELOCITY RATIO: 0.71
ECHO AV VTI: 30.1 CM
ECHO BSA: 2.1 M2
ECHO LA AREA 2C: 13.1 CM2
ECHO LA AREA 4C: 15 CM2
ECHO LA MAJOR AXIS: 5.5 CM
ECHO LA MINOR AXIS: 5.2 CM
ECHO LA VOL BP: 29 ML (ref 22–52)
ECHO LA VOL MOD A2C: 27 ML (ref 22–52)
ECHO LA VOL MOD A4C: 31 ML (ref 22–52)
ECHO LA VOL/BSA BIPLANE: 14 ML/M2 (ref 16–34)
ECHO LA VOLUME INDEX MOD A2C: 13 ML/M2 (ref 16–34)
ECHO LA VOLUME INDEX MOD A4C: 15 ML/M2 (ref 16–34)
ECHO LV E' LATERAL VELOCITY: 12 CM/S
ECHO LV EDV A2C: 48 ML
ECHO LV EDV A4C: 62 ML
ECHO LV EDV INDEX A4C: 31 ML/M2
ECHO LV EDV NDEX A2C: 24 ML/M2
ECHO LV EJECTION FRACTION A2C: 61 %
ECHO LV EJECTION FRACTION A4C: 53 %
ECHO LV EJECTION FRACTION BIPLANE: 57 % (ref 55–100)
ECHO LV ESV A2C: 19 ML
ECHO LV ESV A4C: 29 ML
ECHO LV ESV INDEX A2C: 9 ML/M2
ECHO LV ESV INDEX A4C: 14 ML/M2
ECHO LV FRACTIONAL SHORTENING: 29 % (ref 28–44)
ECHO LV INTERNAL DIMENSION DIASTOLE INDEX: 2.79 CM/M2
ECHO LV INTERNAL DIMENSION DIASTOLIC: 5.6 CM (ref 3.9–5.3)
ECHO LV INTERNAL DIMENSION SYSTOLIC INDEX: 1.99 CM/M2
ECHO LV INTERNAL DIMENSION SYSTOLIC: 4 CM
ECHO LV IVSD: 0.8 CM (ref 0.6–0.9)
ECHO LV MASS 2D: 165 G (ref 67–162)
ECHO LV MASS INDEX 2D: 82.1 G/M2 (ref 43–95)
ECHO LV POSTERIOR WALL DIASTOLIC: 0.8 CM (ref 0.6–0.9)
ECHO LV RELATIVE WALL THICKNESS RATIO: 0.29
ECHO LVOT AV VTI INDEX: 0.69
ECHO LVOT MEAN GRADIENT: 2 MMHG
ECHO LVOT PEAK GRADIENT: 4 MMHG
ECHO LVOT PEAK VELOCITY: 1 M/S
ECHO LVOT VTI: 20.8 CM
ECHO MV A VELOCITY: 0.67 M/S
ECHO MV E DECELERATION TIME (DT): 232 MS
ECHO MV E VELOCITY: 0.54 M/S
ECHO MV E/A RATIO: 0.81
ECHO MV E/E' LATERAL: 4.5
ECHO PV MAX VELOCITY: 0.9 M/S
ECHO PV PEAK GRADIENT: 3 MMHG

## 2024-05-16 PROCEDURE — 93017 CV STRESS TEST TRACING ONLY: CPT

## 2024-05-16 PROCEDURE — 6360000002 HC RX W HCPCS: Performed by: FAMILY MEDICINE

## 2024-05-16 PROCEDURE — 93306 TTE W/DOPPLER COMPLETE: CPT

## 2024-05-16 PROCEDURE — A9500 TC99M SESTAMIBI: HCPCS | Performed by: PHYSICIAN ASSISTANT

## 2024-05-16 PROCEDURE — 3430000000 HC RX DIAGNOSTIC RADIOPHARMACEUTICAL: Performed by: PHYSICIAN ASSISTANT

## 2024-05-16 RX ORDER — TETRAKIS(2-METHOXYISOBUTYLISOCYANIDE)COPPER(I) TETRAFLUOROBORATE 1 MG/ML
30 INJECTION, POWDER, LYOPHILIZED, FOR SOLUTION INTRAVENOUS
Status: COMPLETED | OUTPATIENT
Start: 2024-05-16 | End: 2024-05-16

## 2024-05-16 RX ORDER — REGADENOSON 0.08 MG/ML
0.4 INJECTION, SOLUTION INTRAVENOUS
Status: COMPLETED | OUTPATIENT
Start: 2024-05-16 | End: 2024-05-16

## 2024-05-16 RX ORDER — REGADENOSON 0.08 MG/ML
0.4 INJECTION, SOLUTION INTRAVENOUS
Status: DISCONTINUED | OUTPATIENT
Start: 2024-05-16 | End: 2024-05-19 | Stop reason: HOSPADM

## 2024-05-16 RX ADMIN — Medication 30 MILLICURIE: at 09:40

## 2024-05-16 RX ADMIN — REGADENOSON 0.4 MG: 0.08 INJECTION, SOLUTION INTRAVENOUS at 09:56

## 2024-05-17 ENCOUNTER — HOSPITAL ENCOUNTER (OUTPATIENT)
Dept: NUCLEAR MEDICINE | Age: 64
End: 2024-05-17
Payer: MEDICARE

## 2024-05-17 LAB
NUC STRESS EJECTION FRACTION: 57 %
STRESS BASELINE DIAS BP: 68 MMHG
STRESS BASELINE HR: 61 BPM
STRESS BASELINE SYS BP: 120 MMHG
STRESS PEAK DIAS BP: 90 MMHG
STRESS PEAK SYS BP: 152 MMHG
STRESS PERCENT HR ACHIEVED: 73 %
STRESS POST PEAK HR: 116 BPM
STRESS RATE PRESSURE PRODUCT: NORMAL BPM*MMHG
STRESS STAGE RECOVERY 1 BP: NORMAL MMHG
STRESS STAGE RECOVERY 1 DURATION: 0 MIN:SEC
STRESS STAGE RECOVERY 1 HR: 116 BPM
STRESS STAGE RECOVERY 2 DURATION: 1 MIN:SEC
STRESS STAGE RECOVERY 2 HR: 112 BPM
STRESS STAGE RECOVERY 3 BP: NORMAL MMHG
STRESS STAGE RECOVERY 3 DURATION: 3 MIN:SEC
STRESS STAGE RECOVERY 3 HR: 94 BPM
STRESS STAGE RECOVERY 4 BP: NORMAL MMHG
STRESS STAGE RECOVERY 4 DURATION: 5 MIN:SEC
STRESS STAGE RECOVERY 4 HR: 90 BPM
STRESS TARGET HR: 159 BPM
TID: 0.97

## 2024-05-17 PROCEDURE — 3430000000 HC RX DIAGNOSTIC RADIOPHARMACEUTICAL: Performed by: PHYSICIAN ASSISTANT

## 2024-05-17 PROCEDURE — A9500 TC99M SESTAMIBI: HCPCS | Performed by: PHYSICIAN ASSISTANT

## 2024-05-17 RX ORDER — TETRAKIS(2-METHOXYISOBUTYLISOCYANIDE)COPPER(I) TETRAFLUOROBORATE 1 MG/ML
30 INJECTION, POWDER, LYOPHILIZED, FOR SOLUTION INTRAVENOUS
Status: COMPLETED | OUTPATIENT
Start: 2024-05-17 | End: 2024-05-17

## 2024-05-17 RX ADMIN — Medication 30 MILLICURIE: at 13:15

## 2024-05-20 ENCOUNTER — HOSPITAL ENCOUNTER (OUTPATIENT)
Dept: SLEEP CENTER | Age: 64
Discharge: HOME | End: 2024-05-20
Payer: MEDICARE

## 2024-05-20 ENCOUNTER — TELEPHONE (OUTPATIENT)
Dept: CARDIOLOGY | Age: 64
End: 2024-05-20

## 2024-05-20 VITALS — WEIGHT: 218 LBS | BODY MASS INDEX: 37.22 KG/M2 | HEIGHT: 64 IN

## 2024-05-20 DIAGNOSIS — G47.33 OSA (OBSTRUCTIVE SLEEP APNEA): ICD-10-CM

## 2024-05-20 PROCEDURE — 95810 POLYSOM 6/> YRS 4/> PARAM: CPT

## 2024-05-20 ASSESSMENT — SLEEP AND FATIGUE QUESTIONNAIRES
USUAL AMOUNT OF TIME TO FALL ASLEEP (MIN): 60
FOR THE FIRST 30 MINUTES AFTER WAKING, I AM: SOMEWHAT DROWSY
ARE YOU TIRED DURING WAKE TIME: ALMOST DAILY
HOW OFTEN HAVE YOU NODDED OFF OR FALLEN ASLEEP WHILE DRIVING: 1-2 TIMES A MONTH
WHAT TIME DO YOU USUALLY GO TO BED: 82800
NORMAL AMOUNT OF SLEEP PER NIGHT: 8
HOW LIKELY ARE YOU TO NOD OFF OR FALL ASLEEP WHEN YOU ARE A PASSENGER IN A CAR FOR AN HOUR WITHOUT A BREAK: HIGH CHANCE OF DOZING
I SLEEP WELL: NO
SNORING VOLUME: AS LOUD AS TALKING
HAS ANYONE NOTICED THAT YOU QUIT BREATHING DURING SLEEP: ALMOST DAILY
ARE YOU TIRED AFTER SLEEPING: ALMOST DAILY
ESS TOTAL SCORE: 19
HOW LIKELY ARE YOU TO NOD OFF OR FALL ASLEEP WHILE WATCHING TV: MODERATE CHANCE OF DOZING
HOW LIKELY ARE YOU TO NOD OFF OR FALL ASLEEP WHILE SITTING AND READING: HIGH CHANCE OF DOZING
HOW LIKELY ARE YOU TO NOD OFF OR FALL ASLEEP WHILE LYING DOWN TO REST IN THE AFTERNOON WHEN CIRCUMSTANCES PERMIT: HIGH CHANCE OF DOZING
DO YOU SNORE: YES
DO YOU HAVE HIGH BLOOD PRESSURE: YES
FUNCTION BEST IN: EVENING
HOW LIKELY ARE YOU TO NOD OFF OR FALL ASLEEP WHILE SITTING QUIETLY AFTER LUNCH WITHOUT ALCOHOL: HIGH CHANCE OF DOZING
HAVE YOU EVER NODDED OFF OR FALLEN ASLEEP WHILE DRIVING: YES
HOW OFTEN DO YOU SNORE: 3-4 TIMES A WEEK
HOW LIKELY ARE YOU TO NOD OFF OR FALL ASLEEP IN A CAR, WHILE STOPPED FOR A FEW MINUTES IN TRAFFIC: SLIGHT CHANCE OF DOZING
DOES YOUR SNORING BOTHER OTHERS: YES
NUMBER OF TIMES YOU WAKE PER NIGHT: 0
WHAT TIME DO YOU USUALLY WAKE UP: 32400
HOW LIKELY ARE YOU TO NOD OFF OR FALL ASLEEP WHILE SITTING AND TALKING TO SOMEONE: MODERATE CHANCE OF DOZING

## 2024-05-20 NOTE — TELEPHONE ENCOUNTER
----- Message from Zenobia Doyle PA-C sent at 5/20/2024 10:25 AM EDT -----  Please let them know that their stress test was abnormal, low to intermediate risk. Echo also had some abnormalities. Please make follow up in next 1 to 2 weeks. Thanks.

## 2024-05-21 ENCOUNTER — TELEPHONE (OUTPATIENT)
Dept: CARDIOLOGY | Age: 64
End: 2024-05-21

## 2024-05-21 LAB — STATUS: NORMAL

## 2024-05-21 NOTE — TELEPHONE ENCOUNTER
----- Message from Zenobia Doyle PA-C sent at 5/21/2024  2:09 PM EDT -----  Please notify patient that their sleep study showed periodic limb movement disorder,no MELANI.    Please continue current treatment and follow up.

## 2024-05-21 NOTE — RESULT ENCOUNTER NOTE
Please notify patient that their sleep study showed periodic limb movement disorder,no MELANI.    Please continue current treatment and follow up.

## 2024-05-22 NOTE — PROGRESS NOTES
Patient arrived for split night sleep testing 5/20/24 at 8:15pm. Testing explained, all questions answered, pt voices understanding. Patient did not qualify to split and was run as a diagnostic sleep test

## 2024-05-29 ENCOUNTER — HOSPITAL ENCOUNTER (OUTPATIENT)
Dept: WOMENS IMAGING | Age: 64
Discharge: HOME | End: 2024-05-31
Payer: MEDICARE

## 2024-05-29 DIAGNOSIS — Z12.31 OTHER SCREENING MAMMOGRAM: ICD-10-CM

## 2024-05-29 PROCEDURE — 77063 BREAST TOMOSYNTHESIS BI: CPT

## 2024-06-03 RX ORDER — SUMATRIPTAN 100 MG/1
100 TABLET, FILM COATED ORAL
Qty: 9 TABLET | Refills: 0 | Status: SHIPPED | OUTPATIENT
Start: 2024-06-03 | End: 2024-06-04

## 2024-06-03 NOTE — TELEPHONE ENCOUNTER
Health Maintenance   Topic Date Due    Pneumococcal 0-64 years Vaccine (1 of 2 - PCV) Never done    Diabetic foot exam  Never done    Diabetic Alb to Cr ratio (uACR) test  Never done    Diabetic retinal exam  Never done    Cervical cancer screen  Never done    Colorectal Cancer Screen  Never done    Respiratory Syncytial Virus (RSV) Pregnant or age 60 yrs+ (1 - 1-dose 60+ series) Never done    Annual Wellness Visit (Medicare)  Never done    DTaP/Tdap/Td vaccine (1 - Tdap) 02/21/2025 (Originally 7/2/1981)    COVID-19 Vaccine (1) 02/21/2025 (Originally 1/2/1963)    Hepatitis C screen  02/21/2025 (Originally 7/2/1980)    HIV screen  02/21/2025 (Originally 7/2/1977)    Shingles vaccine (1 of 2) 03/14/2025 (Originally 7/2/2012)    GFR test (Diabetes, CKD 3-4, OR last GFR 15-59)  02/23/2025    A1C test (Diabetic or Prediabetic)  03/14/2025    Depression Monitoring  03/14/2025    Lipids  03/18/2025    Breast cancer screen  05/29/2026    Flu vaccine  Completed    Hepatitis A vaccine  Aged Out    Hepatitis B vaccine  Aged Out    Hib vaccine  Aged Out    Polio vaccine  Aged Out    Meningococcal (ACWY) vaccine  Aged Out    Depression Screen  Discontinued    Diabetes screen  Discontinued             (applicable per patient's age: Cancer Screenings, Depression Screening, Fall Risk Screening, Immunizations)    Hemoglobin A1C (%)   Date Value   03/14/2024 6.1   10/14/2023 6.2 (H)     AST (U/L)   Date Value   03/18/2024 17     ALT (U/L)   Date Value   03/18/2024 11     BUN (mg/dL)   Date Value   03/18/2024 25      (goal A1C is < 7)   (goal LDL is <100) need 30-50% reduction from baseline     BP Readings from Last 3 Encounters:   05/16/24 126/74   04/22/24 126/74   03/14/24 114/76    (goal /80)      All Future Testing planned in [facility]:  Lab Frequency Next Occurrence   Sleep study with PAP titration Once 04/22/2024       Next Visit Date:  Future Appointments   Date Time Provider Department Center   6/4/2024 11:00 AM

## 2024-06-04 ENCOUNTER — OFFICE VISIT (OUTPATIENT)
Dept: GASTROENTEROLOGY | Age: 64
End: 2024-06-04
Payer: MEDICARE

## 2024-06-04 VITALS
RESPIRATION RATE: 18 BRPM | BODY MASS INDEX: 37.42 KG/M2 | OXYGEN SATURATION: 97 % | WEIGHT: 218 LBS | DIASTOLIC BLOOD PRESSURE: 76 MMHG | HEART RATE: 69 BPM | SYSTOLIC BLOOD PRESSURE: 133 MMHG

## 2024-06-04 DIAGNOSIS — Z80.0 FAMILY HISTORY OF COLON CANCER IN FATHER: ICD-10-CM

## 2024-06-04 DIAGNOSIS — Z12.11 COLON CANCER SCREENING: Primary | ICD-10-CM

## 2024-06-04 PROBLEM — E11.9 TYPE 2 DIABETES MELLITUS (HCC): Status: ACTIVE | Noted: 2024-06-04

## 2024-06-04 PROCEDURE — 3078F DIAST BP <80 MM HG: CPT | Performed by: NURSE PRACTITIONER

## 2024-06-04 PROCEDURE — 1036F TOBACCO NON-USER: CPT | Performed by: NURSE PRACTITIONER

## 2024-06-04 PROCEDURE — G8427 DOCREV CUR MEDS BY ELIG CLIN: HCPCS | Performed by: NURSE PRACTITIONER

## 2024-06-04 PROCEDURE — G8417 CALC BMI ABV UP PARAM F/U: HCPCS | Performed by: NURSE PRACTITIONER

## 2024-06-04 PROCEDURE — 3075F SYST BP GE 130 - 139MM HG: CPT | Performed by: NURSE PRACTITIONER

## 2024-06-04 PROCEDURE — 3017F COLORECTAL CA SCREEN DOC REV: CPT | Performed by: NURSE PRACTITIONER

## 2024-06-04 PROCEDURE — 99203 OFFICE O/P NEW LOW 30 MIN: CPT | Performed by: NURSE PRACTITIONER

## 2024-06-04 ASSESSMENT — ENCOUNTER SYMPTOMS
ABDOMINAL PAIN: 0
BLOOD IN STOOL: 0
ANAL BLEEDING: 0
ALLERGIC/IMMUNOLOGIC NEGATIVE: 1
TROUBLE SWALLOWING: 0
CONSTIPATION: 0
GASTROINTESTINAL NEGATIVE: 1

## 2024-06-04 NOTE — PROGRESS NOTES
[address] [facility] [address]  [facility] [address]    Chief Complaint   Patient presents with    Colonoscopy     Patient presents as new patient being referred by PCP to discuss screening colonoscopy. Patient has had colonoscopy in the past 10+ years ago. Patient denies family history of colon cancer (father). Patient reports daily bowel movements with some diarrhea and occasional hard stools. Pt sates some abd pain when passing a hard stool. Denies Melena or hematochezia.       PEG Jazmin Hernandez is a 63 y.o. old female who has a past medical history of hypertension, hyperlipidemia, type 2 diabetes mellitus, MELANI (patient denies), asthma and COPD (non oxygen dependent), history of CVA (at the age of 23 years due to stress), presents today as a new GI referral for colon cancer screening colonoscopy preop visit.  According to Jazmin, she is in the middle of a cardiac workup and has cardiac follow-up scheduled next week.  She reports that she has had an echo and a stress test done that she endorses were abnormal.  Today, she voices that she feels well, doing well with no concerns.  She denies dyspnea, shortness of breath or chest pain.    Family history of upper GI cancer:  No  Family history of colon cancer: Yes: Father and specifics unknown  Blood in stool: No  Unintentional weight loss: Yes  Abdominal pain: No  Prior colonoscopy: Yes: In [place]   Prior EGD: No  Constipation history: No  Number of bowel movements a day: 2  Change in stool caliber: No  History of GERD or Acid Reflux: Yes and feels it is well controlled     Past Surgical History:   Procedure Laterality Date    BACK SURGERY Bilateral 2005    lower lumbar, fragments in L4-L5         Current Outpatient Medications   Medication Sig Dispense Refill    SUMAtriptan (IMITREX) 100 MG tablet TAKE 1 TABLET BY MOUTH ONCE AS NEEDED FOR MIGRAINE 9 tablet 0    simvastatin (ZOCOR) 20 MG tablet Take 1 tablet by mouth nightly 90 tablet 1    Lancets MISC Dispense one

## 2024-06-04 NOTE — PATIENT INSTRUCTIONS
SURVEY:    You may be receiving a survey from [facility] regarding your visit today.    You may get this in the mail, through your MyChart, or in your email.     Please complete the survey to enable us to provide the highest quality of care to you and your family.    If you cannot score us a very good (5 Stars) on any question, please call the office to discuss how we could of made your experience exceptional.    Thank you!    MD Jasmin Gallegos, APRN-FAY Patino, SANDRA Harvey LPN Brenda Boehler, LPN Jena Adams, MA    Phone: [phone]  Fax: [phone]    Office Hours:   M-TH 8-5, F: 8-12

## 2024-06-11 ENCOUNTER — HOSPITAL ENCOUNTER (OUTPATIENT)
Age: 64
Discharge: HOME | End: 2024-06-11
Payer: MEDICARE

## 2024-06-11 ENCOUNTER — OFFICE VISIT (OUTPATIENT)
Dept: CARDIOLOGY | Age: 64
End: 2024-06-11
Payer: MEDICARE

## 2024-06-11 VITALS
HEIGHT: 64 IN | RESPIRATION RATE: 16 BRPM | HEART RATE: 68 BPM | SYSTOLIC BLOOD PRESSURE: 124 MMHG | OXYGEN SATURATION: 97 % | WEIGHT: 213 LBS | BODY MASS INDEX: 36.37 KG/M2 | DIASTOLIC BLOOD PRESSURE: 75 MMHG

## 2024-06-11 DIAGNOSIS — R07.89 ATYPICAL CHEST PAIN: ICD-10-CM

## 2024-06-11 DIAGNOSIS — R06.02 SHORTNESS OF BREATH: ICD-10-CM

## 2024-06-11 DIAGNOSIS — R42 LIGHTHEADEDNESS: ICD-10-CM

## 2024-06-11 DIAGNOSIS — R94.39 ABNORMAL STRESS TEST: ICD-10-CM

## 2024-06-11 DIAGNOSIS — G47.33 OSA (OBSTRUCTIVE SLEEP APNEA): ICD-10-CM

## 2024-06-11 DIAGNOSIS — E11.9 TYPE 2 DIABETES MELLITUS WITHOUT COMPLICATION, UNSPECIFIED WHETHER LONG TERM INSULIN USE (HCC): ICD-10-CM

## 2024-06-11 DIAGNOSIS — E78.2 MIXED HYPERLIPIDEMIA: ICD-10-CM

## 2024-06-11 DIAGNOSIS — I10 ESSENTIAL HYPERTENSION: ICD-10-CM

## 2024-06-11 DIAGNOSIS — R94.39 ABNORMAL STRESS TEST: Primary | ICD-10-CM

## 2024-06-11 LAB
ANION GAP SERPL CALCULATED.3IONS-SCNC: 11 MMOL/L (ref 9–17)
BUN SERPL-MCNC: 23 MG/DL (ref 8–23)
BUN/CREAT SERPL: 26 (ref 9–20)
CALCIUM SERPL-MCNC: 8.9 MG/DL (ref 8.6–10.4)
CHLORIDE SERPL-SCNC: 104 MMOL/L (ref 98–107)
CO2 SERPL-SCNC: 24 MMOL/L (ref 20–31)
CREAT SERPL-MCNC: 0.9 MG/DL (ref 0.5–0.9)
ERYTHROCYTE [DISTWIDTH] IN BLOOD BY AUTOMATED COUNT: 13.2 % (ref 11.8–14.4)
GFR, ESTIMATED: 73 ML/MIN/1.73M2
GLUCOSE SERPL-MCNC: 93 MG/DL (ref 70–99)
HCT VFR BLD AUTO: 41.5 % (ref 36.3–47.1)
HGB BLD-MCNC: 13.6 G/DL (ref 11.9–15.1)
MCH RBC QN AUTO: 30.2 PG (ref 25.2–33.5)
MCHC RBC AUTO-ENTMCNC: 32.8 G/DL (ref 28.4–34.8)
MCV RBC AUTO: 92.2 FL (ref 82.6–102.9)
NRBC BLD-RTO: 0 PER 100 WBC
PLATELET # BLD AUTO: 260 K/UL (ref 138–453)
PMV BLD AUTO: 10.8 FL (ref 8.1–13.5)
POTASSIUM SERPL-SCNC: 3.5 MMOL/L (ref 3.7–5.3)
RBC # BLD AUTO: 4.5 M/UL (ref 3.95–5.11)
SODIUM SERPL-SCNC: 139 MMOL/L (ref 135–144)
WBC OTHER # BLD: 6.7 K/UL (ref 3.5–11.3)

## 2024-06-11 PROCEDURE — G8427 DOCREV CUR MEDS BY ELIG CLIN: HCPCS | Performed by: FAMILY MEDICINE

## 2024-06-11 PROCEDURE — 99215 OFFICE O/P EST HI 40 MIN: CPT | Performed by: FAMILY MEDICINE

## 2024-06-11 PROCEDURE — G8417 CALC BMI ABV UP PARAM F/U: HCPCS | Performed by: FAMILY MEDICINE

## 2024-06-11 PROCEDURE — 1036F TOBACCO NON-USER: CPT | Performed by: FAMILY MEDICINE

## 2024-06-11 PROCEDURE — 99214 OFFICE O/P EST MOD 30 MIN: CPT | Performed by: FAMILY MEDICINE

## 2024-06-11 PROCEDURE — 80048 BASIC METABOLIC PNL TOTAL CA: CPT

## 2024-06-11 PROCEDURE — 85027 COMPLETE CBC AUTOMATED: CPT

## 2024-06-11 PROCEDURE — 36415 COLL VENOUS BLD VENIPUNCTURE: CPT

## 2024-06-11 PROCEDURE — 2022F DILAT RTA XM EVC RTNOPTHY: CPT | Performed by: FAMILY MEDICINE

## 2024-06-11 PROCEDURE — 3074F SYST BP LT 130 MM HG: CPT | Performed by: FAMILY MEDICINE

## 2024-06-11 PROCEDURE — 3017F COLORECTAL CA SCREEN DOC REV: CPT | Performed by: FAMILY MEDICINE

## 2024-06-11 PROCEDURE — 3044F HG A1C LEVEL LT 7.0%: CPT | Performed by: FAMILY MEDICINE

## 2024-06-11 PROCEDURE — 3078F DIAST BP <80 MM HG: CPT | Performed by: FAMILY MEDICINE

## 2024-06-11 NOTE — PROGRESS NOTES
I, Kristin Green RN am scribing for and in the presence of Josiah Fall MD, MS, F.A.C.C..    Patient: Jazmin Hernandez  : 1960  Date of Visit: 2024    REASON FOR VISIT / CONSULTATION: Abnormal Test Results (Abnormal stress test. Patient reports that she had a stress test done due to sharp CP that occurs on/off-10-15 minutes sometimes at rest reading/or sometimes doing dishes. Patient able to walk about half of mile. She does get shortness of breath during household chores and taking showers. Some lightheadedness and dizziness on/off with positional changes. Denies falls or near falls. Denies palpitations. )    History of Present Illness:        Dear PEPPER Dc - CNP,     I had the pleasure of seeing Ms. Hernandez today who is a 63 y.o. female who presents today to establish care with a cardiologist. She has followed with a cardiologist since she was 23 years old after having a stroke. She reports her stroke was brought on by stress. She had some left sided weakness following the stroke.  She moved up from [place] 5-6 years ago. She was seen at [facility] in [place]. Prior to being in [place] they lived in [place] and she had followed with a cardiology there over 20 years ago.    Past cardiac medical history consist of hypertension and hyperlipidemia. She also has type II diabetes, not insulin dependant. She was told she had a heart arrhythmia when she had a stroke. She has been told she had sleep apnea in the past, she used a CPAP in the past. She is not currently using her CPAP machine. She is a former smoker, she smoked 1 pack per day for 20 years, quit in . No history of drug or alcohol use.     Reviewed most recent head CT with and without contrast 2024 and her MRI 10/2023, no sign of prior stroke mentioned.    Family cardiac history consist of mother with unknown heart issues due to cancer, her father had several heart attacks that began in his 40's.     Heart cath done

## 2024-06-20 ENCOUNTER — HOSPITAL ENCOUNTER (OUTPATIENT)
Age: 64
Setting detail: OUTPATIENT SURGERY
Discharge: HOME | End: 2024-06-20
Attending: FAMILY MEDICINE | Admitting: FAMILY MEDICINE
Payer: MEDICARE

## 2024-06-20 VITALS
OXYGEN SATURATION: 95 % | TEMPERATURE: 97.1 F | SYSTOLIC BLOOD PRESSURE: 100 MMHG | HEART RATE: 76 BPM | RESPIRATION RATE: 21 BRPM | DIASTOLIC BLOOD PRESSURE: 65 MMHG

## 2024-06-20 DIAGNOSIS — R94.39 ABNORMAL STRESS TEST: ICD-10-CM

## 2024-06-20 LAB — GLUCOSE BLD-MCNC: 115 MG/DL (ref 74–100)

## 2024-06-20 PROCEDURE — 99152 MOD SED SAME PHYS/QHP 5/>YRS: CPT | Performed by: FAMILY MEDICINE

## 2024-06-20 PROCEDURE — 7100000011 HC PHASE II RECOVERY - ADDTL 15 MIN: Performed by: FAMILY MEDICINE

## 2024-06-20 PROCEDURE — 93458 L HRT ARTERY/VENTRICLE ANGIO: CPT | Performed by: FAMILY MEDICINE

## 2024-06-20 PROCEDURE — 7100000010 HC PHASE II RECOVERY - FIRST 15 MIN: Performed by: FAMILY MEDICINE

## 2024-06-20 PROCEDURE — C1894 INTRO/SHEATH, NON-LASER: HCPCS | Performed by: FAMILY MEDICINE

## 2024-06-20 PROCEDURE — 6360000002 HC RX W HCPCS: Performed by: FAMILY MEDICINE

## 2024-06-20 PROCEDURE — 2500000003 HC RX 250 WO HCPCS: Performed by: FAMILY MEDICINE

## 2024-06-20 PROCEDURE — 2709999900 HC NON-CHARGEABLE SUPPLY: Performed by: FAMILY MEDICINE

## 2024-06-20 PROCEDURE — 2580000003 HC RX 258: Performed by: FAMILY MEDICINE

## 2024-06-20 PROCEDURE — 6360000004 HC RX CONTRAST MEDICATION: Performed by: FAMILY MEDICINE

## 2024-06-20 PROCEDURE — C1769 GUIDE WIRE: HCPCS | Performed by: FAMILY MEDICINE

## 2024-06-20 PROCEDURE — 82947 ASSAY GLUCOSE BLOOD QUANT: CPT

## 2024-06-20 RX ORDER — NITROGLYCERIN 20 MG/100ML
INJECTION INTRAVENOUS PRN
Status: DISCONTINUED | OUTPATIENT
Start: 2024-06-20 | End: 2024-06-20 | Stop reason: HOSPADM

## 2024-06-20 RX ORDER — SODIUM CHLORIDE 9 MG/ML
INJECTION, SOLUTION INTRAVENOUS PRN
Status: DISCONTINUED | OUTPATIENT
Start: 2024-06-20 | End: 2024-06-20 | Stop reason: HOSPADM

## 2024-06-20 RX ORDER — LIDOCAINE HYDROCHLORIDE 10 MG/ML
INJECTION, SOLUTION INFILTRATION; PERINEURAL PRN
Status: DISCONTINUED | OUTPATIENT
Start: 2024-06-20 | End: 2024-06-20 | Stop reason: HOSPADM

## 2024-06-20 RX ORDER — ACETAMINOPHEN 325 MG/1
650 TABLET ORAL EVERY 4 HOURS PRN
Status: DISCONTINUED | OUTPATIENT
Start: 2024-06-20 | End: 2024-06-20 | Stop reason: HOSPADM

## 2024-06-20 RX ORDER — HEPARIN SODIUM 1000 [USP'U]/ML
INJECTION, SOLUTION INTRAVENOUS; SUBCUTANEOUS PRN
Status: DISCONTINUED | OUTPATIENT
Start: 2024-06-20 | End: 2024-06-20 | Stop reason: HOSPADM

## 2024-06-20 RX ORDER — MIDAZOLAM HYDROCHLORIDE 1 MG/ML
INJECTION INTRAMUSCULAR; INTRAVENOUS PRN
Status: DISCONTINUED | OUTPATIENT
Start: 2024-06-20 | End: 2024-06-20 | Stop reason: HOSPADM

## 2024-06-20 RX ORDER — SODIUM CHLORIDE 0.9 % (FLUSH) 0.9 %
5-40 SYRINGE (ML) INJECTION EVERY 12 HOURS SCHEDULED
Status: DISCONTINUED | OUTPATIENT
Start: 2024-06-20 | End: 2024-06-20 | Stop reason: HOSPADM

## 2024-06-20 RX ORDER — SODIUM CHLORIDE 0.9 % (FLUSH) 0.9 %
5-40 SYRINGE (ML) INJECTION PRN
Status: DISCONTINUED | OUTPATIENT
Start: 2024-06-20 | End: 2024-06-20 | Stop reason: HOSPADM

## 2024-06-20 RX ORDER — DIPHENHYDRAMINE HCL 25 MG
50 CAPSULE ORAL ONCE
Status: DISCONTINUED | OUTPATIENT
Start: 2024-06-20 | End: 2024-06-20 | Stop reason: HOSPADM

## 2024-06-20 RX ORDER — NITROGLYCERIN 0.4 MG/1
0.4 TABLET SUBLINGUAL EVERY 5 MIN PRN
Status: DISCONTINUED | OUTPATIENT
Start: 2024-06-20 | End: 2024-06-20 | Stop reason: HOSPADM

## 2024-06-20 RX ORDER — SODIUM CHLORIDE 9 MG/ML
INJECTION, SOLUTION INTRAVENOUS CONTINUOUS
Status: DISCONTINUED | OUTPATIENT
Start: 2024-06-20 | End: 2024-06-20 | Stop reason: HOSPADM

## 2024-06-20 RX ADMIN — SODIUM CHLORIDE: 9 INJECTION, SOLUTION INTRAVENOUS at 09:27

## 2024-06-20 NOTE — DISCHARGE INSTRUCTIONS
Discharge Instructions for Cardiac Catheterization    A cardiac catheterization is a diagnostic test used to evaluate the health of the heart and its blood vessels. The test is done with a thin catheter carefully threaded into your heart from a leg or arm artery. Most likely, you will be allowed to go home the same day as the procedure.   Steps to Take at Home:   Pain- apply ice to site 15-20 minutes every hour for the first 2 days.   Showering is okay 24 hours after procedure.    No soaking in a pool, hot tub, bath tub, or standing water for one week.   Bleeding (outward or under the skin-hematoma)- apply firm pressure for 10-15 minutes or until the bleeding stops, then call your doctor. If unable to get bleeding stopped, call 911.    Kidney damage- Call if you urinate less than normal, have swelling or feel puffy, and/or gain 2 or more pounds over night in the first week.   If procedure was in ARM:  You were instructed to keep wrist straight and still for two hours after the procedure. The arm and hand may now be used for normal daily activities except, avoid using the heal of hand while getting up and down from furniture for the first few days.  Keep affected arm elevated, hand higher than elbow, while pressure dressing in place to decrease swelling.  1)  Gauze and Elastoplast   Remove in 4 hours as follows:     TIME________4:45 PM_______________  Remove 1 piece of tape at a time, waiting 15 -20 minutes between layers to monitor for bleeding.   If dressing sticks, place wrist under cool running water to help loosen gauze from site then pat site dry.   If hand feels numb, tingly, and/or cold- loosen first 1-2 layers of tape if dressing still in place.  If no relief noticed, remove pressure dressing as per above instructions. Seek medical help if no relief or if dressing already off.     Wash area with soap and water daily while leaving it open to air, no bandaids or ointment.    Drink plenty of fluids after the

## 2024-06-20 NOTE — PROGRESS NOTES
Elastoplast loosened. No new signs of bleeding, education given on signs of bleeding and hematomas

## 2024-06-20 NOTE — PROGRESS NOTES
All discharge instructions given. All questions answered at this time. All belongings returned. PIV removed per protocol.

## 2024-06-21 DIAGNOSIS — K21.9 GASTROESOPHAGEAL REFLUX DISEASE WITHOUT ESOPHAGITIS: Primary | ICD-10-CM

## 2024-06-21 RX ORDER — OMEPRAZOLE 20 MG/1
20 CAPSULE, DELAYED RELEASE ORAL DAILY
Qty: 90 CAPSULE | Refills: 1 | Status: SHIPPED | OUTPATIENT
Start: 2024-06-21

## 2024-06-21 NOTE — TELEPHONE ENCOUNTER
Health Maintenance   Topic Date Due    Pneumococcal 0-64 years Vaccine (1 of 2 - PCV) Never done    Diabetic foot exam  Never done    Diabetic Alb to Cr ratio (uACR) test  Never done    Diabetic retinal exam  Never done    Cervical cancer screen  Never done    Colorectal Cancer Screen  Never done    Respiratory Syncytial Virus (RSV) Pregnant or age 60 yrs+ (1 - 1-dose 60+ series) Never done    Annual Wellness Visit (Medicare)  Never done    DTaP/Tdap/Td vaccine (1 - Tdap) 02/21/2025 (Originally 7/2/1981)    COVID-19 Vaccine (1) 02/21/2025 (Originally 1/2/1963)    Hepatitis C screen  02/21/2025 (Originally 7/2/1980)    HIV screen  02/21/2025 (Originally 7/2/1977)    Shingles vaccine (1 of 2) 03/14/2025 (Originally 7/2/2012)    A1C test (Diabetic or Prediabetic)  03/14/2025    Depression Monitoring  03/14/2025    Lipids  03/18/2025    GFR test (Diabetes, CKD 3-4, OR last GFR 15-59)  06/11/2025    Breast cancer screen  05/29/2026    Flu vaccine  Completed    Hepatitis A vaccine  Aged Out    Hepatitis B vaccine  Aged Out    Hib vaccine  Aged Out    Polio vaccine  Aged Out    Meningococcal (ACWY) vaccine  Aged Out    Depression Screen  Discontinued    Diabetes screen  Discontinued             (applicable per patient's age: Cancer Screenings, Depression Screening, Fall Risk Screening, Immunizations)    Hemoglobin A1C (%)   Date Value   03/14/2024 6.1   10/14/2023 6.2 (H)     AST (U/L)   Date Value   03/18/2024 17     ALT (U/L)   Date Value   03/18/2024 11     BUN (mg/dL)   Date Value   06/11/2024 23      (goal A1C is < 7)   (goal LDL is <100) need 30-50% reduction from baseline     BP Readings from Last 3 Encounters:   06/20/24 100/65   06/11/24 124/75   06/04/24 133/76    (goal /80)      All Future Testing planned in [facility]:  Lab Frequency Next Occurrence   Sleep study with PAP titration Once 04/22/2024       Next Visit Date:  Future Appointments   Date Time Provider Department Center   7/17/2024  2:00 PM [facility]

## 2024-06-24 RX ORDER — ACETAMINOPHEN 160 MG
TABLET,DISINTEGRATING ORAL DAILY
Qty: 90 CAPSULE | Refills: 0 | Status: SHIPPED | OUTPATIENT
Start: 2024-06-24

## 2024-06-25 RX ORDER — LOSARTAN POTASSIUM 25 MG/1
50 TABLET ORAL DAILY
Qty: 180 TABLET | Refills: 0 | Status: SHIPPED | OUTPATIENT
Start: 2024-06-25

## 2024-06-25 NOTE — TELEPHONE ENCOUNTER
Lashon MITCHELL, APRN - CNP [facility]            Patient Active Problem List:     Acute delirium     HTN (hypertension)     GERD (gastroesophageal reflux disease)     Generalized weakness     Rhinorrhea     Fatigue     Stroke-like symptoms     Staring episodes     Seizure-like activity (HCC)     Type 2 diabetes mellitus     Abnormal stress test

## 2024-06-26 RX ORDER — TOPIRAMATE 100 MG/1
100 TABLET, FILM COATED ORAL 2 TIMES DAILY
Qty: 60 TABLET | Refills: 1 | Status: SHIPPED | OUTPATIENT
Start: 2024-06-26

## 2024-07-14 SDOH — HEALTH STABILITY: PHYSICAL HEALTH: ON AVERAGE, HOW MANY DAYS PER WEEK DO YOU ENGAGE IN MODERATE TO STRENUOUS EXERCISE (LIKE A BRISK WALK)?: 1 DAY

## 2024-07-14 SDOH — HEALTH STABILITY: PHYSICAL HEALTH: ON AVERAGE, HOW MANY MINUTES DO YOU ENGAGE IN EXERCISE AT THIS LEVEL?: PATIENT DECLINED

## 2024-07-14 ASSESSMENT — PATIENT HEALTH QUESTIONNAIRE - PHQ9
SUM OF ALL RESPONSES TO PHQ QUESTIONS 1-9: 11
5. POOR APPETITE OR OVEREATING: NOT AT ALL
6. FEELING BAD ABOUT YOURSELF - OR THAT YOU ARE A FAILURE OR HAVE LET YOURSELF OR YOUR FAMILY DOWN: MORE THAN HALF THE DAYS
2. FEELING DOWN, DEPRESSED OR HOPELESS: SEVERAL DAYS
1. LITTLE INTEREST OR PLEASURE IN DOING THINGS: MORE THAN HALF THE DAYS
9. THOUGHTS THAT YOU WOULD BE BETTER OFF DEAD, OR OF HURTING YOURSELF: NOT AT ALL
SUM OF ALL RESPONSES TO PHQ QUESTIONS 1-9: 11
SUM OF ALL RESPONSES TO PHQ QUESTIONS 1-9: 11
10. IF YOU CHECKED OFF ANY PROBLEMS, HOW DIFFICULT HAVE THESE PROBLEMS MADE IT FOR YOU TO DO YOUR WORK, TAKE CARE OF THINGS AT HOME, OR GET ALONG WITH OTHER PEOPLE: SOMEWHAT DIFFICULT
SUM OF ALL RESPONSES TO PHQ QUESTIONS 1-9: 11
3. TROUBLE FALLING OR STAYING ASLEEP: NEARLY EVERY DAY
7. TROUBLE CONCENTRATING ON THINGS, SUCH AS READING THE NEWSPAPER OR WATCHING TELEVISION: NOT AT ALL
4. FEELING TIRED OR HAVING LITTLE ENERGY: NEARLY EVERY DAY
SUM OF ALL RESPONSES TO PHQ9 QUESTIONS 1 & 2: 3
8. MOVING OR SPEAKING SO SLOWLY THAT OTHER PEOPLE COULD HAVE NOTICED. OR THE OPPOSITE, BEING SO FIGETY OR RESTLESS THAT YOU HAVE BEEN MOVING AROUND A LOT MORE THAN USUAL: NOT AT ALL

## 2024-07-14 ASSESSMENT — LIFESTYLE VARIABLES
HOW OFTEN DO YOU HAVE A DRINK CONTAINING ALCOHOL: NEVER
HOW OFTEN DO YOU HAVE SIX OR MORE DRINKS ON ONE OCCASION: 1
HOW MANY STANDARD DRINKS CONTAINING ALCOHOL DO YOU HAVE ON A TYPICAL DAY: PATIENT DOES NOT DRINK
HOW OFTEN DO YOU HAVE A DRINK CONTAINING ALCOHOL: 1
HOW MANY STANDARD DRINKS CONTAINING ALCOHOL DO YOU HAVE ON A TYPICAL DAY: 0

## 2024-07-17 ENCOUNTER — OFFICE VISIT (OUTPATIENT)
Dept: PRIMARY CARE CLINIC | Age: 64
End: 2024-07-17
Payer: MEDICARE

## 2024-07-17 VITALS
DIASTOLIC BLOOD PRESSURE: 82 MMHG | BODY MASS INDEX: 36.12 KG/M2 | TEMPERATURE: 98.7 F | RESPIRATION RATE: 16 BRPM | OXYGEN SATURATION: 98 % | WEIGHT: 211.6 LBS | HEIGHT: 64 IN | SYSTOLIC BLOOD PRESSURE: 128 MMHG | HEART RATE: 70 BPM

## 2024-07-17 DIAGNOSIS — Z00.00 INITIAL MEDICARE ANNUAL WELLNESS VISIT: Primary | ICD-10-CM

## 2024-07-17 DIAGNOSIS — Z79.4 TYPE 2 DIABETES MELLITUS WITHOUT COMPLICATION, WITH LONG-TERM CURRENT USE OF INSULIN (HCC): ICD-10-CM

## 2024-07-17 DIAGNOSIS — G25.81 RLS (RESTLESS LEGS SYNDROME): ICD-10-CM

## 2024-07-17 DIAGNOSIS — F32.9 MAJOR DEPRESSIVE DISORDER, REMISSION STATUS UNSPECIFIED, UNSPECIFIED WHETHER RECURRENT: ICD-10-CM

## 2024-07-17 DIAGNOSIS — E11.9 TYPE 2 DIABETES MELLITUS WITHOUT COMPLICATION, WITH LONG-TERM CURRENT USE OF INSULIN (HCC): ICD-10-CM

## 2024-07-17 DIAGNOSIS — G43.809 OTHER MIGRAINE WITHOUT STATUS MIGRAINOSUS, NOT INTRACTABLE: ICD-10-CM

## 2024-07-17 DIAGNOSIS — M79.605 LEFT LEG PAIN: ICD-10-CM

## 2024-07-17 DIAGNOSIS — R53.83 OTHER FATIGUE: ICD-10-CM

## 2024-07-17 DIAGNOSIS — E08.21 DIABETES MELLITUS DUE TO UNDERLYING CONDITION WITH DIABETIC NEPHROPATHY, WITHOUT LONG-TERM CURRENT USE OF INSULIN (HCC): ICD-10-CM

## 2024-07-17 LAB — HBA1C MFR BLD: 5.9 %

## 2024-07-17 PROCEDURE — 3017F COLORECTAL CA SCREEN DOC REV: CPT | Performed by: NURSE PRACTITIONER

## 2024-07-17 PROCEDURE — 3074F SYST BP LT 130 MM HG: CPT | Performed by: NURSE PRACTITIONER

## 2024-07-17 PROCEDURE — G8417 CALC BMI ABV UP PARAM F/U: HCPCS | Performed by: NURSE PRACTITIONER

## 2024-07-17 PROCEDURE — 83036 HEMOGLOBIN GLYCOSYLATED A1C: CPT | Performed by: NURSE PRACTITIONER

## 2024-07-17 PROCEDURE — 1036F TOBACCO NON-USER: CPT | Performed by: NURSE PRACTITIONER

## 2024-07-17 PROCEDURE — 3044F HG A1C LEVEL LT 7.0%: CPT | Performed by: NURSE PRACTITIONER

## 2024-07-17 PROCEDURE — 3079F DIAST BP 80-89 MM HG: CPT | Performed by: NURSE PRACTITIONER

## 2024-07-17 PROCEDURE — 99214 OFFICE O/P EST MOD 30 MIN: CPT | Performed by: NURSE PRACTITIONER

## 2024-07-17 PROCEDURE — G0438 PPPS, INITIAL VISIT: HCPCS | Performed by: NURSE PRACTITIONER

## 2024-07-17 PROCEDURE — 2022F DILAT RTA XM EVC RTNOPTHY: CPT | Performed by: NURSE PRACTITIONER

## 2024-07-17 PROCEDURE — G8427 DOCREV CUR MEDS BY ELIG CLIN: HCPCS | Performed by: NURSE PRACTITIONER

## 2024-07-17 RX ORDER — GABAPENTIN 100 MG/1
100 CAPSULE ORAL NIGHTLY
Qty: 30 CAPSULE | Refills: 0 | Status: SHIPPED | OUTPATIENT
Start: 2024-07-17 | End: 2024-08-16

## 2024-07-17 RX ORDER — SERTRALINE HYDROCHLORIDE 100 MG/1
100 TABLET, FILM COATED ORAL DAILY
Qty: 30 TABLET | Refills: 0 | Status: SHIPPED | OUTPATIENT
Start: 2024-07-17 | End: 2024-08-16

## 2024-07-17 NOTE — PROGRESS NOTES
Medicare Annual Wellness Visit    Jazmin Hernandez is here for Medicare AWV, Diabetes (3 month check. Patient doing well and needs A1c checked. ), and Leg Pain (Patient c/o left leg pain for a couple months. Denies injury. )    Assessment & Plan   Initial Medicare annual wellness visit  Type 2 diabetes mellitus without complication, with long-term current use of insulin (HCC)  -     POCT glycosylated hemoglobin (Hb A1C)  RLS (restless legs syndrome)  -     gabapentin (NEURONTIN) 100 MG capsule; Take 1 capsule by mouth nightly for 30 days. Intended supply: 90 days, Disp-30 capsule, R-0Normal  Other fatigue  -     TSH With Reflex Ft4; Future  -     Vitamin B12 & Folate; Future  -     Vitamin D 25 Hydroxy; Future  -     Iron and TIBC; Future  Diabetes mellitus due to underlying condition with diabetic nephropathy, without long-term current use of insulin (HCC)  -     Iron and TIBC; Future  Other migraine without status migrainosus, not intractable  -     [facility] - Kimmie Olguin MD, Neurology, [place]  Major depressive disorder, remission status unspecified, unspecified whether recurrent  -     sertraline (ZOLOFT) 50 MG tablet; Take 1 tablet by mouth daily Taking total 150 mg daily, Disp-30 tablet, R-0Normal  -     sertraline (ZOLOFT) 100 MG tablet; Take 1 tablet by mouth daily Taking 150 mg daily, Disp-30 tablet, R-0Normal  Left leg pain  -     [facility] [place]    Recommendations for Preventive Services Due: see orders and patient instructions/AVS.  Recommended screening schedule for the next 5-10 years is provided to the patient in written form: see Patient Instructions/AVS.     Return in 1 year (on 7/17/2025) for Medicare AWV.     Subjective   The following acute and/or chronic problems were also addressed today:    She has been having some increased fatigue.  She has been sleeping and will get 9-10 hours of sleep.  She states she does feel tired daily.  She feels tired as soon as she wakes up.   She

## 2024-07-17 NOTE — PATIENT INSTRUCTIONS
levels.     Limit the amount of solid fat--butter, margarine, and shortening--you eat. Use olive, peanut, or canola oil when you cook. Bake, broil, and steam foods instead of frying them.     Eat a variety of fruit and vegetables every day. Dark green, deep orange, red, or yellow fruits and vegetables are especially good for you. Examples include spinach, carrots, peaches, and berries.     Foods high in fiber can reduce your cholesterol and provide important vitamins and minerals. High-fiber foods include whole-grain cereals and breads, oatmeal, beans, brown rice, citrus fruits, and apples.     Eat lean proteins. Heart-healthy proteins include seafood, lean meats and poultry, eggs, beans, peas, nuts, seeds, and soy products.     Limit drinks and foods with added sugar. These include candy, desserts, and soda pop.   Heart-healthy lifestyle    If your doctor recommends it, get more exercise. For many people, walking is a good choice. Or you may want to swim, bike, or do other activities. Bit by bit, increase the time you're active every day. Try for at least 30 minutes on most days of the week.     Try to quit or cut back on using tobacco and other nicotine products. This includes smoking and vaping. If you need help quitting, talk to your doctor about stop-smoking programs and medicines. These can increase your chances of quitting for good. Quitting is one of the most important things you can do to protect your heart. It is never too late to quit. Try to avoid secondhand smoke too.     Stay at a weight that's healthy for you. Talk to your doctor if you need help losing weight.     Try to get 7 to 9 hours of sleep each night.     Limit alcohol to 2 drinks a day for men and 1 drink a day for women. Too much alcohol can cause health problems.     Manage other health problems such as diabetes, high blood pressure, and high cholesterol. If you think you may have a problem with alcohol or drug use, talk to your doctor.

## 2024-07-19 RX ORDER — TOPIRAMATE 100 MG/1
100 TABLET, FILM COATED ORAL 2 TIMES DAILY
Qty: 180 TABLET | Refills: 1 | OUTPATIENT
Start: 2024-07-19

## 2024-07-19 NOTE — TELEPHONE ENCOUNTER
Health Maintenance   Topic Date Due    Pneumococcal 0-64 years Vaccine (1 of 2 - PCV) Never done    Diabetic foot exam  Never done    Diabetic Alb to Cr ratio (uACR) test  Never done    Diabetic retinal exam  Never done    Cervical cancer screen  Never done    Colorectal Cancer Screen  Never done    Respiratory Syncytial Virus (RSV) Pregnant or age 60 yrs+ (1 - 1-dose 60+ series) Never done    DTaP/Tdap/Td vaccine (1 - Tdap) 02/21/2025 (Originally 7/2/1981)    COVID-19 Vaccine (1) 02/21/2025 (Originally 1/2/1963)    Hepatitis C screen  02/21/2025 (Originally 7/2/1980)    HIV screen  02/21/2025 (Originally 7/2/1977)    Shingles vaccine (1 of 2) 03/14/2025 (Originally 7/2/2012)    Flu vaccine (1) 08/01/2024    Lipids  03/18/2025    GFR test (Diabetes, CKD 3-4, OR last GFR 15-59)  06/11/2025    Depression Monitoring  07/14/2025    A1C test (Diabetic or Prediabetic)  07/17/2025    Annual Wellness Visit (Medicare)  07/18/2025    Breast cancer screen  05/29/2026    Hepatitis A vaccine  Aged Out    Hepatitis B vaccine  Aged Out    Hib vaccine  Aged Out    Polio vaccine  Aged Out    Meningococcal (ACWY) vaccine  Aged Out    Depression Screen  Discontinued    Diabetes screen  Discontinued             (applicable per patient's age: Cancer Screenings, Depression Screening, Fall Risk Screening, Immunizations)    Hemoglobin A1C (%)   Date Value   07/17/2024 5.9   03/14/2024 6.1   10/14/2023 6.2 (H)     AST (U/L)   Date Value   03/18/2024 17     ALT (U/L)   Date Value   03/18/2024 11     BUN (mg/dL)   Date Value   06/11/2024 23      (goal A1C is < 7)   (goal LDL is <100) need 30-50% reduction from baseline     BP Readings from Last 3 Encounters:   07/17/24 128/82   06/20/24 100/65   06/11/24 124/75    (goal /80)      All Future Testing planned in [facility]:  Lab Frequency Next Occurrence   Sleep study with PAP titration Once 04/22/2024   TSH With Reflex Ft4 Once 07/17/2024   Vitamin B12 & Folate Once 07/17/2024   Vitamin D

## 2024-07-24 ENCOUNTER — HOSPITAL ENCOUNTER (EMERGENCY)
Age: 64
Discharge: HOME | End: 2024-07-24
Payer: MEDICARE

## 2024-07-24 ENCOUNTER — APPOINTMENT (OUTPATIENT)
Dept: CT IMAGING | Age: 64
End: 2024-07-24
Payer: MEDICARE

## 2024-07-24 VITALS
WEIGHT: 211 LBS | HEART RATE: 57 BPM | SYSTOLIC BLOOD PRESSURE: 139 MMHG | OXYGEN SATURATION: 92 % | RESPIRATION RATE: 13 BRPM | TEMPERATURE: 98.6 F | DIASTOLIC BLOOD PRESSURE: 72 MMHG | BODY MASS INDEX: 36.22 KG/M2

## 2024-07-24 DIAGNOSIS — R51.9 ACUTE NONINTRACTABLE HEADACHE, UNSPECIFIED HEADACHE TYPE: ICD-10-CM

## 2024-07-24 DIAGNOSIS — F39 MOOD DISORDER (HCC): Primary | ICD-10-CM

## 2024-07-24 LAB
ANION GAP SERPL CALCULATED.3IONS-SCNC: 9 MMOL/L (ref 9–17)
BASOPHILS # BLD: 0.04 K/UL (ref 0–0.2)
BASOPHILS NFR BLD: 1 % (ref 0–2)
BILIRUB UR QL STRIP: NEGATIVE
BUN SERPL-MCNC: 15 MG/DL (ref 8–23)
BUN/CREAT SERPL: 17 (ref 9–20)
CALCIUM SERPL-MCNC: 9.1 MG/DL (ref 8.6–10.4)
CASTS #/AREA URNS LPF: ABNORMAL /LPF
CHLORIDE SERPL-SCNC: 104 MMOL/L (ref 98–107)
CLARITY UR: CLEAR
CO2 SERPL-SCNC: 25 MMOL/L (ref 20–31)
COLOR UR: YELLOW
CREAT SERPL-MCNC: 0.9 MG/DL (ref 0.5–0.9)
EKG ATRIAL RATE: 66 BPM
EKG P AXIS: 22 DEGREES
EKG P-R INTERVAL: 168 MS
EKG Q-T INTERVAL: 438 MS
EKG QRS DURATION: 90 MS
EKG QTC CALCULATION (BAZETT): 459 MS
EKG R AXIS: -10 DEGREES
EKG T AXIS: -18 DEGREES
EKG VENTRICULAR RATE: 66 BPM
EOSINOPHIL # BLD: 0.1 K/UL (ref 0–0.44)
EOSINOPHILS RELATIVE PERCENT: 2 % (ref 1–4)
EPI CELLS #/AREA URNS HPF: ABNORMAL /HPF (ref 0–25)
ERYTHROCYTE [DISTWIDTH] IN BLOOD BY AUTOMATED COUNT: 13.6 % (ref 11.8–14.4)
GFR, ESTIMATED: 72 ML/MIN/1.73M2
GLUCOSE BLD-MCNC: 101 MG/DL (ref 74–100)
GLUCOSE SERPL-MCNC: 97 MG/DL (ref 70–99)
GLUCOSE UR STRIP-MCNC: NEGATIVE MG/DL
HCT VFR BLD AUTO: 39.4 % (ref 36.3–47.1)
HGB BLD-MCNC: 13.2 G/DL (ref 11.9–15.1)
HGB UR QL STRIP.AUTO: NEGATIVE
IMM GRANULOCYTES # BLD AUTO: <0.03 K/UL (ref 0–0.3)
IMM GRANULOCYTES NFR BLD: 0 %
KETONES UR STRIP-MCNC: NEGATIVE MG/DL
LEUKOCYTE ESTERASE UR QL STRIP: NEGATIVE
LYMPHOCYTES NFR BLD: 1.58 K/UL (ref 1.1–3.7)
LYMPHOCYTES RELATIVE PERCENT: 27 % (ref 24–43)
MCH RBC QN AUTO: 31.1 PG (ref 25.2–33.5)
MCHC RBC AUTO-ENTMCNC: 33.5 G/DL (ref 28.4–34.8)
MCV RBC AUTO: 92.7 FL (ref 82.6–102.9)
MONOCYTES NFR BLD: 0.3 K/UL (ref 0.1–1.2)
MONOCYTES NFR BLD: 5 % (ref 3–12)
MUCOUS THREADS URNS QL MICRO: ABNORMAL
NEUTROPHILS NFR BLD: 65 % (ref 36–65)
NEUTS SEG NFR BLD: 3.77 K/UL (ref 1.5–8.1)
NITRITE UR QL STRIP: NEGATIVE
NRBC BLD-RTO: 0 PER 100 WBC
PH UR STRIP: 6 [PH] (ref 5–9)
PLATELET # BLD AUTO: 239 K/UL (ref 138–453)
PMV BLD AUTO: 11.4 FL (ref 8.1–13.5)
POTASSIUM SERPL-SCNC: 3.7 MMOL/L (ref 3.7–5.3)
PROT UR STRIP-MCNC: NEGATIVE MG/DL
RBC # BLD AUTO: 4.25 M/UL (ref 3.95–5.11)
RBC #/AREA URNS HPF: ABNORMAL /HPF (ref 0–2)
SODIUM SERPL-SCNC: 138 MMOL/L (ref 135–144)
SP GR UR STRIP: 1.02 (ref 1.01–1.02)
UROBILINOGEN UR STRIP-ACNC: NORMAL EU/DL (ref 0–1)
WBC #/AREA URNS HPF: ABNORMAL /HPF (ref 0–5)
WBC OTHER # BLD: 5.8 K/UL (ref 3.5–11.3)

## 2024-07-24 PROCEDURE — 80048 BASIC METABOLIC PNL TOTAL CA: CPT

## 2024-07-24 PROCEDURE — 93005 ELECTROCARDIOGRAM TRACING: CPT | Performed by: EMERGENCY MEDICINE

## 2024-07-24 PROCEDURE — 36415 COLL VENOUS BLD VENIPUNCTURE: CPT

## 2024-07-24 PROCEDURE — 70450 CT HEAD/BRAIN W/O DYE: CPT

## 2024-07-24 PROCEDURE — 93010 ELECTROCARDIOGRAM REPORT: CPT | Performed by: FAMILY MEDICINE

## 2024-07-24 PROCEDURE — 99284 EMERGENCY DEPT VISIT MOD MDM: CPT

## 2024-07-24 PROCEDURE — 6370000000 HC RX 637 (ALT 250 FOR IP): Performed by: PHYSICIAN ASSISTANT

## 2024-07-24 PROCEDURE — 81001 URINALYSIS AUTO W/SCOPE: CPT

## 2024-07-24 PROCEDURE — 82947 ASSAY GLUCOSE BLOOD QUANT: CPT

## 2024-07-24 PROCEDURE — 85025 COMPLETE CBC W/AUTO DIFF WBC: CPT

## 2024-07-24 RX ORDER — ACETAMINOPHEN 325 MG/1
650 TABLET ORAL ONCE
Status: COMPLETED | OUTPATIENT
Start: 2024-07-24 | End: 2024-07-24

## 2024-07-24 RX ADMIN — ACETAMINOPHEN 650 MG: 325 TABLET ORAL at 14:41

## 2024-07-24 ASSESSMENT — ENCOUNTER SYMPTOMS
ABDOMINAL PAIN: 0
COUGH: 0
SHORTNESS OF BREATH: 0

## 2024-07-24 NOTE — ED NOTES
Writer spoke with Chino Jerez and provided update on orders with results so far. She requested to be called when all results are back. Primary RN made aware.

## 2024-07-24 NOTE — ED NOTES
Patient able to wake up enough to pass swallow screening, however when patient is asked questions she either says I dont know or pretends to be asleep

## 2024-07-24 NOTE — ED PROVIDER NOTES
[facility]  EMERGENCY DEPARTMENT ENCOUNTER      Pt Name: Jazmin Hernandez  MRN: [id number]  Birthdate 1960  Date of evaluation: 7/24/2024  Provider: Barby Harris PA-C    CHIEF COMPLAINT       Chief Complaint   Patient presents with    Headache     Left sided headache, recently started. EMS called to home for unresponsive but breathing. Patient currently alert and oriented, denies falls/injury, states she was looking at her phone and then she was here.          HISTORY OF PRESENT ILLNESS      Jazmin Hernandez is a 64 y.o. female who presents to the emergency department due to altered mental status.  Patient presents emergency department not talking and holding her eyes closed.  Patient has a history of this behavior in the past.  Patient states that she has a headache.  She states she took all her medicine this morning.  She not take any medicine for her headache.  Patient states that she called the ambulance to bring her to the emergency department.  Patient is groggy and states that thoughts are foggy but she is speaking in full and clear sentences.  No injury or trauma.  Patient is not in any pain.        REVIEW OF SYSTEMS       Review of Systems   Constitutional:  Negative for activity change, appetite change, chills and fever.   Respiratory:  Negative for cough and shortness of breath.    Gastrointestinal:  Negative for abdominal pain.   Neurological:  Positive for headaches. Negative for speech difficulty and weakness.         PAST MEDICAL HISTORY     Past Medical History:   Diagnosis Date    Anxiety     Arthritis     Asthma     CAD (coronary artery disease) 1983    When i had my stroke at 23    Cerebral artery occlusion with cerebral infarction (HCC)     CHF (congestive heart failure) ([facility]) 2020    It was in [place]    COPD (chronic obstructive pulmonary disease) ([facility])     Depression     Diabetes mellitus ([facility])     History of blood transfusion     Hyperlipidemia     Hypertension     Irregular heartbeat

## 2024-07-25 ENCOUNTER — TELEPHONE (OUTPATIENT)
Dept: PRIMARY CARE CLINIC | Age: 64
End: 2024-07-25

## 2024-07-25 NOTE — TELEPHONE ENCOUNTER
[facility] Transitions Initial Follow Up Call    Outreach made within 2 business days of discharge: Yes    Patient: Jazmin Hernandez Patient : 1960   MRN: [id number]  Reason for Admission: There are no discharge diagnoses documented for the most recent discharge.  Discharge Date: 24       Spoke with: Jazmin    Discharge department/facility: [facility]     TCM Interactive Patient Contact:  Was patient able to fill all prescriptions: Yes  Was patient instructed to bring all medications to the follow-up visit: Yes  Is patient taking all medications as directed in the discharge summary? Yes  Does patient understand their discharge instructions: Yes  Does patient have questions or concerns that need addressed prior to 7-14 day follow up office visit: no    Scheduled appointment with PCP within 7-14 days    Follow Up  Future Appointments   Date Time Provider Department Center   2024  8:30 AM Soha Sahu, PT [facility] [place]   2024  1:40 PM Lashon Caceres, APRN - CNP [place] [facility]   2024  2:20 PM Kimmie Olguin MD [place] [facility] -   2025  2:20 PM Lashon Caceres, APRN - CNP [place] [facility]       Sofia Hough MA

## 2024-08-10 DIAGNOSIS — E78.5 HYPERLIPIDEMIA, UNSPECIFIED HYPERLIPIDEMIA TYPE: ICD-10-CM

## 2024-08-10 DIAGNOSIS — F32.9 MAJOR DEPRESSIVE DISORDER, REMISSION STATUS UNSPECIFIED, UNSPECIFIED WHETHER RECURRENT: ICD-10-CM

## 2024-08-12 RX ORDER — SIMVASTATIN 20 MG
20 TABLET ORAL NIGHTLY
Qty: 90 TABLET | Refills: 1 | Status: SHIPPED | OUTPATIENT
Start: 2024-08-12

## 2024-08-12 RX ORDER — SERTRALINE HYDROCHLORIDE 100 MG/1
100 TABLET, FILM COATED ORAL DAILY
Qty: 90 TABLET | Refills: 1 | Status: SHIPPED | OUTPATIENT
Start: 2024-08-12 | End: 2025-02-08

## 2024-08-12 NOTE — TELEPHONE ENCOUNTER
Health Maintenance   Topic Date Due    Pneumococcal 0-64 years Vaccine (1 of 2 - PCV) Never done    Diabetic foot exam  Never done    Diabetic Alb to Cr ratio (uACR) test  Never done    Diabetic retinal exam  Never done    Cervical cancer screen  Never done    Colorectal Cancer Screen  Never done    Respiratory Syncytial Virus (RSV) Pregnant or age 60 yrs+ (1 - 1-dose 60+ series) Never done    Flu vaccine (1) 08/01/2024    DTaP/Tdap/Td vaccine (1 - Tdap) 02/21/2025 (Originally 7/2/1981)    COVID-19 Vaccine (1 - 2023-24 season) 02/21/2025 (Originally 9/1/2023)    Hepatitis C screen  02/21/2025 (Originally 7/2/1980)    HIV screen  02/21/2025 (Originally 7/2/1977)    Shingles vaccine (1 of 2) 03/14/2025 (Originally 7/2/2012)    Lipids  03/18/2025    Depression Monitoring  07/14/2025    A1C test (Diabetic or Prediabetic)  07/17/2025    Annual Wellness Visit (Medicare)  07/18/2025    GFR test (Diabetes, CKD 3-4, OR last GFR 15-59)  07/24/2025    Breast cancer screen  05/29/2026    Hepatitis A vaccine  Aged Out    Hepatitis B vaccine  Aged Out    Hib vaccine  Aged Out    Polio vaccine  Aged Out    Meningococcal (ACWY) vaccine  Aged Out    Depression Screen  Discontinued    Diabetes screen  Discontinued             (applicable per patient's age: Cancer Screenings, Depression Screening, Fall Risk Screening, Immunizations)    Hemoglobin A1C (%)   Date Value   07/17/2024 5.9   03/14/2024 6.1   10/14/2023 6.2 (H)     AST (U/L)   Date Value   03/18/2024 17     ALT (U/L)   Date Value   03/18/2024 11     BUN (mg/dL)   Date Value   07/24/2024 15      (goal A1C is < 7)   (goal LDL is <100) need 30-50% reduction from baseline     BP Readings from Last 3 Encounters:   07/24/24 139/72   07/17/24 128/82   06/20/24 100/65    (goal /80)      All Future Testing planned in [facility]:  Lab Frequency Next Occurrence   Sleep study with PAP titration Once 04/22/2024   TSH With Reflex Ft4 Once 07/17/2024   Vitamin B12 & Folate Once

## 2024-08-30 DIAGNOSIS — M54.41 ACUTE MIDLINE LOW BACK PAIN WITH RIGHT-SIDED SCIATICA: ICD-10-CM

## 2024-08-30 DIAGNOSIS — M19.91 PRIMARY OSTEOARTHRITIS, UNSPECIFIED SITE: ICD-10-CM

## 2024-09-03 RX ORDER — MELOXICAM 15 MG/1
15 TABLET ORAL DAILY
Qty: 90 TABLET | Refills: 1 | OUTPATIENT
Start: 2024-09-03

## 2024-09-21 RX ORDER — LOSARTAN POTASSIUM 50 MG/1
50 TABLET ORAL DAILY
Qty: 90 TABLET | Refills: 1 | Status: SHIPPED | OUTPATIENT
Start: 2024-09-21

## 2024-11-06 DIAGNOSIS — K21.9 GASTROESOPHAGEAL REFLUX DISEASE WITHOUT ESOPHAGITIS: ICD-10-CM

## 2024-11-10 ENCOUNTER — APPOINTMENT (OUTPATIENT)
Dept: CT IMAGING | Age: 64
End: 2024-11-10
Payer: MEDICARE

## 2024-11-10 ENCOUNTER — HOSPITAL ENCOUNTER (EMERGENCY)
Age: 64
Discharge: HOME | End: 2024-11-10
Payer: MEDICARE

## 2024-11-10 VITALS
HEIGHT: 63 IN | BODY MASS INDEX: 37.85 KG/M2 | WEIGHT: 213.63 LBS | DIASTOLIC BLOOD PRESSURE: 88 MMHG | SYSTOLIC BLOOD PRESSURE: 109 MMHG | RESPIRATION RATE: 14 BRPM | TEMPERATURE: 98.6 F | HEART RATE: 73 BPM | OXYGEN SATURATION: 98 %

## 2024-11-10 DIAGNOSIS — I95.1 ORTHOSTATIC HYPOTENSION: ICD-10-CM

## 2024-11-10 DIAGNOSIS — R55 SYNCOPE AND COLLAPSE: Primary | ICD-10-CM

## 2024-11-10 DIAGNOSIS — N30.00 ACUTE CYSTITIS WITHOUT HEMATURIA: ICD-10-CM

## 2024-11-10 DIAGNOSIS — E86.0 DEHYDRATION: ICD-10-CM

## 2024-11-10 DIAGNOSIS — M54.9 MUSCULOSKELETAL BACK PAIN: ICD-10-CM

## 2024-11-10 LAB
ALBUMIN SERPL-MCNC: 4.1 G/DL (ref 3.5–5.2)
ALBUMIN/GLOB SERPL: 1.5 {RATIO} (ref 1–2.5)
ALP SERPL-CCNC: 92 U/L (ref 35–104)
ALT SERPL-CCNC: 8 U/L (ref 10–35)
ANION GAP SERPL CALCULATED.3IONS-SCNC: 15 MMOL/L (ref 9–16)
AST SERPL-CCNC: 17 U/L (ref 10–35)
BACTERIA URNS QL MICRO: ABNORMAL
BASOPHILS # BLD: 0.05 K/UL (ref 0–0.2)
BASOPHILS NFR BLD: 1 % (ref 0–2)
BILIRUB SERPL-MCNC: 0.5 MG/DL (ref 0–1.2)
BILIRUB UR QL STRIP: NEGATIVE
BNP SERPL-MCNC: 556 PG/ML (ref 0–125)
BUN SERPL-MCNC: 22 MG/DL (ref 8–23)
BUN/CREAT SERPL: 20 (ref 9–20)
CALCIUM SERPL-MCNC: 9.3 MG/DL (ref 8.6–10.4)
CASTS #/AREA URNS LPF: ABNORMAL /LPF
CASTS #/AREA URNS LPF: ABNORMAL /LPF
CHLORIDE SERPL-SCNC: 106 MMOL/L (ref 98–107)
CLARITY UR: CLEAR
CO2 SERPL-SCNC: 20 MMOL/L (ref 20–31)
COLOR UR: YELLOW
CREAT SERPL-MCNC: 1.1 MG/DL (ref 0.5–0.9)
D DIMER PPP FEU-MCNC: 2.29 UG/ML FEU (ref 0–0.59)
EKG ATRIAL RATE: 69 BPM
EKG P AXIS: -8 DEGREES
EKG P-R INTERVAL: 178 MS
EKG Q-T INTERVAL: 458 MS
EKG QRS DURATION: 90 MS
EKG QTC CALCULATION (BAZETT): 490 MS
EKG R AXIS: -19 DEGREES
EKG T AXIS: -64 DEGREES
EKG VENTRICULAR RATE: 69 BPM
EOSINOPHIL # BLD: 0.09 K/UL (ref 0–0.44)
EOSINOPHILS RELATIVE PERCENT: 1 % (ref 1–4)
EPI CELLS #/AREA URNS HPF: ABNORMAL /HPF (ref 0–25)
ERYTHROCYTE [DISTWIDTH] IN BLOOD BY AUTOMATED COUNT: 13.2 % (ref 11.8–14.4)
GFR, ESTIMATED: 58 ML/MIN/1.73M2
GLUCOSE SERPL-MCNC: 169 MG/DL (ref 74–99)
GLUCOSE UR STRIP-MCNC: NEGATIVE MG/DL
HCT VFR BLD AUTO: 42 % (ref 36.3–47.1)
HGB BLD-MCNC: 14.1 G/DL (ref 11.9–15.1)
HGB UR QL STRIP.AUTO: NEGATIVE
IMM GRANULOCYTES # BLD AUTO: 0.03 K/UL (ref 0–0.3)
IMM GRANULOCYTES NFR BLD: 0 %
KETONES UR STRIP-MCNC: NEGATIVE MG/DL
LEUKOCYTE ESTERASE UR QL STRIP: ABNORMAL
LYMPHOCYTES NFR BLD: 1.17 K/UL (ref 1.1–3.7)
LYMPHOCYTES RELATIVE PERCENT: 15 % (ref 24–43)
MCH RBC QN AUTO: 31.1 PG (ref 25.2–33.5)
MCHC RBC AUTO-ENTMCNC: 33.6 G/DL (ref 28.4–34.8)
MCV RBC AUTO: 92.5 FL (ref 82.6–102.9)
MONOCYTES NFR BLD: 0.39 K/UL (ref 0.1–1.2)
MONOCYTES NFR BLD: 5 % (ref 3–12)
MUCOUS THREADS URNS QL MICRO: ABNORMAL
NEUTROPHILS NFR BLD: 78 % (ref 36–65)
NEUTS SEG NFR BLD: 6.05 K/UL (ref 1.5–8.1)
NITRITE UR QL STRIP: NEGATIVE
NRBC BLD-RTO: 0 PER 100 WBC
PH UR STRIP: 6 [PH] (ref 5–9)
PLATELET # BLD AUTO: 249 K/UL (ref 138–453)
PMV BLD AUTO: 10.8 FL (ref 8.1–13.5)
POTASSIUM SERPL-SCNC: 3.5 MMOL/L (ref 3.7–5.3)
PROT SERPL-MCNC: 6.9 G/DL (ref 6.6–8.7)
PROT UR STRIP-MCNC: NEGATIVE MG/DL
RBC # BLD AUTO: 4.54 M/UL (ref 3.95–5.11)
RBC #/AREA URNS HPF: ABNORMAL /HPF (ref 0–2)
SODIUM SERPL-SCNC: 141 MMOL/L (ref 136–145)
SP GR UR STRIP: 1.01 (ref 1.01–1.02)
TROPONIN I SERPL HS-MCNC: 15 NG/L (ref 0–14)
TROPONIN I SERPL HS-MCNC: 17 NG/L (ref 0–14)
UROBILINOGEN UR STRIP-ACNC: NORMAL EU/DL (ref 0–1)
WBC #/AREA URNS HPF: ABNORMAL /HPF (ref 0–5)
WBC OTHER # BLD: 7.8 K/UL (ref 3.5–11.3)

## 2024-11-10 PROCEDURE — 6360000004 HC RX CONTRAST MEDICATION

## 2024-11-10 PROCEDURE — 85379 FIBRIN DEGRADATION QUANT: CPT

## 2024-11-10 PROCEDURE — 80053 COMPREHEN METABOLIC PANEL: CPT

## 2024-11-10 PROCEDURE — 72125 CT NECK SPINE W/O DYE: CPT

## 2024-11-10 PROCEDURE — 81001 URINALYSIS AUTO W/SCOPE: CPT

## 2024-11-10 PROCEDURE — 2580000003 HC RX 258

## 2024-11-10 PROCEDURE — 99285 EMERGENCY DEPT VISIT HI MDM: CPT

## 2024-11-10 PROCEDURE — 96374 THER/PROPH/DIAG INJ IV PUSH: CPT

## 2024-11-10 PROCEDURE — 96361 HYDRATE IV INFUSION ADD-ON: CPT

## 2024-11-10 PROCEDURE — 70450 CT HEAD/BRAIN W/O DYE: CPT

## 2024-11-10 PROCEDURE — 93010 ELECTROCARDIOGRAM REPORT: CPT | Performed by: FAMILY MEDICINE

## 2024-11-10 PROCEDURE — 6370000000 HC RX 637 (ALT 250 FOR IP)

## 2024-11-10 PROCEDURE — 93005 ELECTROCARDIOGRAM TRACING: CPT

## 2024-11-10 PROCEDURE — 83880 ASSAY OF NATRIURETIC PEPTIDE: CPT

## 2024-11-10 PROCEDURE — 85025 COMPLETE CBC W/AUTO DIFF WBC: CPT

## 2024-11-10 PROCEDURE — 84484 ASSAY OF TROPONIN QUANT: CPT

## 2024-11-10 PROCEDURE — 71260 CT THORAX DX C+: CPT

## 2024-11-10 PROCEDURE — 6360000002 HC RX W HCPCS

## 2024-11-10 RX ORDER — TRAMADOL HYDROCHLORIDE 50 MG/1
50 TABLET ORAL EVERY 8 HOURS PRN
Qty: 9 TABLET | Refills: 0 | Status: SHIPPED | OUTPATIENT
Start: 2024-11-10 | End: 2024-11-13

## 2024-11-10 RX ORDER — 0.9 % SODIUM CHLORIDE 0.9 %
1000 INTRAVENOUS SOLUTION INTRAVENOUS ONCE
Status: COMPLETED | OUTPATIENT
Start: 2024-11-10 | End: 2024-11-10

## 2024-11-10 RX ORDER — FENTANYL CITRATE 50 UG/ML
50 INJECTION, SOLUTION INTRAMUSCULAR; INTRAVENOUS ONCE
Status: COMPLETED | OUTPATIENT
Start: 2024-11-10 | End: 2024-11-10

## 2024-11-10 RX ORDER — CEFDINIR 300 MG/1
300 CAPSULE ORAL ONCE
Status: DISCONTINUED | OUTPATIENT
Start: 2024-11-10 | End: 2024-11-10 | Stop reason: HOSPADM

## 2024-11-10 RX ORDER — CEFDINIR 300 MG/1
300 CAPSULE ORAL 2 TIMES DAILY
Qty: 14 CAPSULE | Refills: 0 | Status: SHIPPED | OUTPATIENT
Start: 2024-11-10 | End: 2024-11-17

## 2024-11-10 RX ORDER — POTASSIUM CHLORIDE 1500 MG/1
40 TABLET, EXTENDED RELEASE ORAL ONCE
Status: COMPLETED | OUTPATIENT
Start: 2024-11-10 | End: 2024-11-10

## 2024-11-10 RX ORDER — IOPAMIDOL 755 MG/ML
75 INJECTION, SOLUTION INTRAVASCULAR
Status: COMPLETED | OUTPATIENT
Start: 2024-11-10 | End: 2024-11-10

## 2024-11-10 RX ORDER — LIDOCAINE 50 MG/G
1 PATCH TOPICAL DAILY
Qty: 5 PATCH | Refills: 0 | Status: SHIPPED | OUTPATIENT
Start: 2024-11-10 | End: 2024-11-15

## 2024-11-10 RX ADMIN — SODIUM CHLORIDE 1000 ML: 9 INJECTION, SOLUTION INTRAVENOUS at 15:41

## 2024-11-10 RX ADMIN — POTASSIUM CHLORIDE 40 MEQ: 1500 TABLET, EXTENDED RELEASE ORAL at 17:50

## 2024-11-10 RX ADMIN — IOPAMIDOL 75 ML: 755 INJECTION, SOLUTION INTRAVENOUS at 16:41

## 2024-11-10 RX ADMIN — FENTANYL CITRATE 50 MCG: 0.05 INJECTION, SOLUTION INTRAMUSCULAR; INTRAVENOUS at 17:18

## 2024-11-10 ASSESSMENT — PAIN - FUNCTIONAL ASSESSMENT: PAIN_FUNCTIONAL_ASSESSMENT: 0-10

## 2024-11-10 ASSESSMENT — PAIN SCALES - GENERAL
PAINLEVEL_OUTOF10: 10
PAINLEVEL_OUTOF10: 10

## 2024-11-10 ASSESSMENT — PAIN DESCRIPTION - ORIENTATION: ORIENTATION: RIGHT;LOWER

## 2024-11-10 ASSESSMENT — PAIN DESCRIPTION - LOCATION: LOCATION: BACK

## 2024-11-10 ASSESSMENT — PAIN DESCRIPTION - DESCRIPTORS: DESCRIPTORS: SPASM

## 2024-11-10 ASSESSMENT — PAIN DESCRIPTION - PAIN TYPE: TYPE: ACUTE PAIN

## 2024-11-10 NOTE — DISCHARGE INSTRUCTIONS
Please call your primary care doctor tomorrow to schedule follow-up visit within 2 to 3 days.  Continue oral hydration at home, medications for your back pain was given.  Return to the ED for any worsening symptoms or concerns.    Take your medication as indicated and prescribed.  If you are given an antibiotic then, make sure you get the prescription filled and take the antibiotics until finished.  Drink plenty of water while taking the antibiotics.  Avoid drinking alcohol or drinks that have caffeine in it while taking antibiotics.   If you were given the medication pyridium (or take over the counter Azo) - do not wear any contacts for the next week since this medication will turn your tears an orange color and will stain the contacts.      For pain use acetaminophen (Tylenol) or ibuprofen (Motrin / Advil), unless prescribed medications that have acetaminophen or ibuprofen (or similar medications) in it.  You can take over the counter acetaminophen tablets (1 - 2 tablets of the 500-mg strength every 6 hours) or ibuprofen tablets (2 tablets every 4 hours).    PLEASE RETURN TO THE EMERGENCY DEPARTMENT IMMEDIATELY for worsening symptoms, inability to urinate, worsening of blood in your urine, or if you develop any concerning symptoms such as: high fever not relieved by acetaminophen (Tylenol) and/or ibuprofen (Motrin / Advil), chills, shortness of breath, chest pain, feeling of your heart fluttering or racing, persistent nausea and/or vomiting, vomiting up blood, blood in your stool, loss of consciousness, numbness, weakness or tingling in the arms or legs or change in color of the extremities, changes in mental status, persistent headache, blurry vision, loss of bladder / bowel.

## 2024-11-10 NOTE — ED NOTES
POA called to advise of pt nearing d/c status. Acknowledged understanding. Pt provided with meal tray, tolerating well.

## 2024-11-10 NOTE — ED PROVIDER NOTES
This report was transcribed using voice recognition software. Every effort was made to ensure accuracy; however, inadvertent computerized transcription errors may be present         Yamilka Mcclain MD  11/10/24 [id number]

## 2024-11-10 NOTE — ED NOTES
Dr Mcclain advised to waive labs for this pt. CT called to assess ready time, advised pt can not be brought down at this time

## 2024-12-05 DIAGNOSIS — M54.41 ACUTE MIDLINE LOW BACK PAIN WITH RIGHT-SIDED SCIATICA: ICD-10-CM

## 2024-12-05 DIAGNOSIS — M19.91 PRIMARY OSTEOARTHRITIS, UNSPECIFIED SITE: ICD-10-CM

## 2024-12-05 RX ORDER — MELOXICAM 15 MG/1
15 TABLET ORAL DAILY
Qty: 90 TABLET | Refills: 1 | Status: SHIPPED | OUTPATIENT
Start: 2024-12-05

## 2024-12-05 NOTE — TELEPHONE ENCOUNTER
Health Maintenance   Topic Date Due    Pneumococcal 0-64 years Vaccine (1 of 2 - PCV) Never done    Diabetic foot exam  Never done    Diabetic Alb to Cr ratio (uACR) test  Never done    Diabetic retinal exam  Never done    Cervical cancer screen  Never done    Colorectal Cancer Screen  Never done    Respiratory Syncytial Virus (RSV) Pregnant or age 60 yrs+ (1 - Risk 60-74 years 1-dose series) Never done    Flu vaccine (1) 08/01/2024    COVID-19 Vaccine (1 - 2023-24 season) Never done    DTaP/Tdap/Td vaccine (1 - Tdap) 02/21/2025 (Originally 7/2/1981)    Hepatitis C screen  02/21/2025 (Originally 7/2/1980)    HIV screen  02/21/2025 (Originally 7/2/1977)    Shingles vaccine (1 of 2) 03/14/2025 (Originally 7/2/2012)    Lipids  03/18/2025    Depression Monitoring  07/14/2025    A1C test (Diabetic or Prediabetic)  07/17/2025    Annual Wellness Visit (Medicare)  07/18/2025    GFR test (Diabetes, CKD 3-4, OR last GFR 15-59)  11/10/2025    Breast cancer screen  05/29/2026    Hepatitis A vaccine  Aged Out    Hepatitis B vaccine  Aged Out    Hib vaccine  Aged Out    Polio vaccine  Aged Out    Meningococcal (ACWY) vaccine  Aged Out    Depression Screen  Discontinued    Diabetes screen  Discontinued             (applicable per patient's age: Cancer Screenings, Depression Screening, Fall Risk Screening, Immunizations)    Hemoglobin A1C (%)   Date Value   07/17/2024 5.9   03/14/2024 6.1   10/14/2023 6.2 (H)     AST (U/L)   Date Value   11/10/2024 17     ALT (U/L)   Date Value   11/10/2024 8 (L)     BUN (mg/dL)   Date Value   11/10/2024 22      (goal A1C is < 7)   (goal LDL is <100) need 30-50% reduction from baseline     BP Readings from Last 3 Encounters:   11/10/24 109/88   07/24/24 139/72   07/17/24 128/82    (goal /80)      All Future Testing planned in [facility]:  Lab Frequency Next Occurrence   Sleep study with PAP titration Once 04/22/2024   TSH With Reflex Ft4 Once 07/17/2024   Vitamin B12 & Folate Once 07/17/2024

## 2025-07-01 ENCOUNTER — HOSPITAL ENCOUNTER (EMERGENCY)
Age: 65
Discharge: HOME | End: 2025-07-01
Payer: MEDICARE

## 2025-07-01 VITALS
HEART RATE: 58 BPM | DIASTOLIC BLOOD PRESSURE: 83 MMHG | SYSTOLIC BLOOD PRESSURE: 146 MMHG | OXYGEN SATURATION: 99 % | TEMPERATURE: 97.88 F

## 2025-07-01 VITALS — OXYGEN SATURATION: 98 % | HEART RATE: 59 BPM

## 2025-07-01 VITALS — DIASTOLIC BLOOD PRESSURE: 74 MMHG | HEART RATE: 56 BPM | SYSTOLIC BLOOD PRESSURE: 125 MMHG | OXYGEN SATURATION: 98 %

## 2025-07-01 VITALS — HEART RATE: 57 BPM | OXYGEN SATURATION: 99 %

## 2025-07-01 VITALS — OXYGEN SATURATION: 96 % | HEART RATE: 59 BPM

## 2025-07-01 VITALS — OXYGEN SATURATION: 97 % | HEART RATE: 57 BPM

## 2025-07-01 VITALS — OXYGEN SATURATION: 98 %

## 2025-07-01 VITALS — SYSTOLIC BLOOD PRESSURE: 125 MMHG | DIASTOLIC BLOOD PRESSURE: 74 MMHG

## 2025-07-01 VITALS — BODY MASS INDEX: 34 KG/M2 | DIASTOLIC BLOOD PRESSURE: 83 MMHG | SYSTOLIC BLOOD PRESSURE: 146 MMHG

## 2025-07-01 VITALS — OXYGEN SATURATION: 92 % | HEART RATE: 64 BPM

## 2025-07-01 VITALS — OXYGEN SATURATION: 98 % | HEART RATE: 57 BPM

## 2025-07-01 VITALS — HEART RATE: 61 BPM | OXYGEN SATURATION: 99 %

## 2025-07-01 VITALS — OXYGEN SATURATION: 97 % | HEART RATE: 60 BPM

## 2025-07-01 VITALS — OXYGEN SATURATION: 99 %

## 2025-07-01 VITALS — HEART RATE: 110 BPM

## 2025-07-01 VITALS — HEART RATE: 59 BPM

## 2025-07-01 DIAGNOSIS — Z86.73: ICD-10-CM

## 2025-07-01 DIAGNOSIS — F32.A: ICD-10-CM

## 2025-07-01 DIAGNOSIS — I10: ICD-10-CM

## 2025-07-01 DIAGNOSIS — E11.9: ICD-10-CM

## 2025-07-01 DIAGNOSIS — Z79.84: ICD-10-CM

## 2025-07-01 DIAGNOSIS — F44.5: Primary | ICD-10-CM

## 2025-07-01 LAB
ADD MANUAL DIFF: NO
ANION GAP: 9.4
BASOPHILS # BLD AUTO: 0.1 10^3/UL (ref 0–0.1)
BASOPHILS NFR BLD AUTO: 0.9 % (ref 0.2–2)
BUN SERPL-MCNC: 25 MG/DL (ref 7–18)
BUN/CREAT SERPL: 23.1
CALCIUM: 8.8 MG/DL (ref 8.5–10.1)
CHLORIDE: 110 MMOL/L (ref 98–107)
CO2 BLD-SCNC: 28.5 MMOL/L (ref 21–32)
CREAT SERPL-SCNC: 1.08 MG/DL (ref 0.55–1.02)
EOSINOPHIL # BLD AUTO: 0.2 10^3/UL (ref 0–0.7)
EOSINOPHIL NFR BLD AUTO: 3.2 % (ref 0.9–7)
ERYTHROCYTE [DISTWIDTH] IN BLOOD BY AUTOMATED COUNT: 14.6 % (ref 11–15)
ESTIMATED GFR (AFRICAN AMERICA: >60
ESTIMATED GFR (NON-AFRICAN AME: 51
GLUCOSE SERPLBLD-MCNC: 108 MG/DL (ref 74–106)
HCT VFR BLD CALC: 33.2 % (ref 36–48)
HEMOGLOBIN: 10.7 G/DL (ref 12–16)
IMMATURE GRANULOCYTES ABS AUTO: 0.01 10^3/UL (ref 0–0.03)
IMMATURE GRANULOCYTES PCT AUTO: 0.2 % (ref 0–0.5)
LYMPHOCYTES  ABSOLUTE AUTO: 1.3 10^3/UL (ref 1.2–3.8)
LYMPHOCYTES PERCENT AUTO: 24.2 % (ref 20.5–60)
MCH RBC QN AUTO: 31.5 PG (ref 26.7–34)
MCV RBC: 97.6 FL (ref 81–99)
MEAN CORPUSCULAR HGB CONC: 32.2 G/DL (ref 29.9–35.2)
MEAN PLATELET VOLUME: 9.7 FL (ref 9.5–13.5)
MONOCYTES # BLD AUTO: 0.3 10^3/UL (ref 0.3–0.8)
MONOCYTES NFR BLD AUTO: 6.3 % (ref 1.7–12)
NEUTROPHILS # BLD AUTO: 3.4 10^3/UL (ref 1.4–6.5)
NEUTROPHILS NFR BLD AUTO: 65.2 % (ref 43–75)
PLATELET # BLD: 285 10^3/UL (ref 150–450)
POTASSIUM SERPLBLD-SCNC: 3.9 MMOL/L (ref 3.5–5.1)
RBC # BLD AUTO: 3.4 10^6/UL (ref 4.2–5.4)
SODIUM BLD-SCNC: 144 MMOL/L (ref 136–145)
TROPONIN I HIGH SENSITIVITY: 4.9 PG/ML (ref 4–51.3)
WBC # BLD: 5.3 10^3/UL (ref 4–11)

## 2025-07-01 PROCEDURE — 36415 COLL VENOUS BLD VENIPUNCTURE: CPT

## 2025-07-01 PROCEDURE — 93005 ELECTROCARDIOGRAM TRACING: CPT

## 2025-07-01 PROCEDURE — 70450 CT HEAD/BRAIN W/O DYE: CPT

## 2025-07-01 PROCEDURE — 81001 URINALYSIS AUTO W/SCOPE: CPT

## 2025-07-01 PROCEDURE — 85025 COMPLETE CBC W/AUTO DIFF WBC: CPT

## 2025-07-01 PROCEDURE — 84484 ASSAY OF TROPONIN QUANT: CPT

## 2025-07-01 PROCEDURE — 99285 EMERGENCY DEPT VISIT HI MDM: CPT

## 2025-07-01 PROCEDURE — 80048 BASIC METABOLIC PNL TOTAL CA: CPT

## (undated) DEVICE — MERCY TIFFIN CATH LAB PACK: Brand: MEDLINE INDUSTRIES, INC.

## (undated) DEVICE — RADIFOCUS OPTITORQUE ANGIOGRAPHIC CATHETER: Brand: OPTITORQUE

## (undated) DEVICE — CATHETER ANGIO 6FR L110CM ID0.056IN VENT PIG CRV ROBUST

## (undated) DEVICE — GLOVE ORANGE PI 7 1/2   MSG9075

## (undated) DEVICE — BENTSON WIRE GUIDE 20CM DISTAL FLEXIBILITY WITH SOFTENED TIP: Brand: BENTSON

## (undated) DEVICE — GLIDESHEATH NITINOL HYDROPHILIC COATED INTRODUCER SHEATH: Brand: GLIDESHEATH

## (undated) DEVICE — DRAPE, RADIAL, STERILE: Brand: MEDLINE

## (undated) DEVICE — MEDTRONIC JL3.5 DIAGNOSTIC CATHETER